# Patient Record
Sex: FEMALE | Race: WHITE | Employment: OTHER | URBAN - NONMETROPOLITAN AREA
[De-identification: names, ages, dates, MRNs, and addresses within clinical notes are randomized per-mention and may not be internally consistent; named-entity substitution may affect disease eponyms.]

---

## 2021-03-09 RX ORDER — AMLODIPINE BESYLATE 5 MG/1
TABLET ORAL
Qty: 90 TABLET | Refills: 1 | Status: SHIPPED
Start: 2021-03-09 | End: 2021-08-16

## 2021-05-05 ENCOUNTER — OUTSIDE SERVICES (OUTPATIENT)
Dept: FAMILY MEDICINE CLINIC | Age: 86
End: 2021-05-05
Payer: MEDICARE

## 2021-05-05 DIAGNOSIS — M19.90 OSTEOARTHRITIS, UNSPECIFIED OSTEOARTHRITIS TYPE, UNSPECIFIED SITE: ICD-10-CM

## 2021-05-05 DIAGNOSIS — M79.605 PAIN OF LEFT LOWER EXTREMITY: ICD-10-CM

## 2021-05-05 DIAGNOSIS — M54.50 LUMBAR PAIN: ICD-10-CM

## 2021-05-05 DIAGNOSIS — E78.2 MIXED HYPERLIPIDEMIA: ICD-10-CM

## 2021-05-05 DIAGNOSIS — I63.9 CEREBROVASCULAR ACCIDENT (CVA), UNSPECIFIED MECHANISM (HCC): ICD-10-CM

## 2021-05-05 DIAGNOSIS — K58.2 IRRITABLE BOWEL SYNDROME WITH BOTH CONSTIPATION AND DIARRHEA: Primary | ICD-10-CM

## 2021-05-05 DIAGNOSIS — I10 ESSENTIAL HYPERTENSION: ICD-10-CM

## 2021-05-05 DIAGNOSIS — M81.0 AGE-RELATED OSTEOPOROSIS WITHOUT CURRENT PATHOLOGICAL FRACTURE: ICD-10-CM

## 2021-05-05 NOTE — PROGRESS NOTES
leg no increase in warmth or deformity noted. Neurologically she  is alert and oriented x3. No evidence of paralysis or paresthesias noted. Diagnoses and all orders for this visit:    Irritable bowel syndrome with both constipation and diarrhea    Essential hypertension    Age-related osteoporosis without current pathological fracture    Lumbar pain    Mixed hyperlipidemia    Osteoarthritis, unspecified osteoarthritis type, unspecified site    Cerebrovascular accident (CVA), unspecified mechanism ([address]    Pain of left lower extremity      Will check an x-ray of the left lower extremity. Patient did take some Pepto-Bismol for her nausea and diarrhea. She will go ahead and take her blood pressure medicine to get that back down. She will let us know if that is not improved. Hanna Marcus, APRN - CNP        *Note was creating using voice recognition software.   The document was reviewed however grammatical errors may exist.

## 2021-06-09 ENCOUNTER — OUTSIDE SERVICES (OUTPATIENT)
Dept: FAMILY MEDICINE CLINIC | Age: 86
End: 2021-06-09

## 2021-06-09 DIAGNOSIS — E78.2 MIXED HYPERLIPIDEMIA: ICD-10-CM

## 2021-06-09 DIAGNOSIS — K58.2 IRRITABLE BOWEL SYNDROME WITH BOTH CONSTIPATION AND DIARRHEA: Primary | ICD-10-CM

## 2021-06-09 DIAGNOSIS — I10 ESSENTIAL HYPERTENSION: ICD-10-CM

## 2021-06-09 DIAGNOSIS — G47.00 INSOMNIA, UNSPECIFIED TYPE: ICD-10-CM

## 2021-06-09 DIAGNOSIS — K57.90 DIVERTICULAR DISEASE: ICD-10-CM

## 2021-06-09 DIAGNOSIS — M15.9 PRIMARY OSTEOARTHRITIS INVOLVING MULTIPLE JOINTS: ICD-10-CM

## 2021-06-09 DIAGNOSIS — G89.29 CHRONIC RIGHT-SIDED LOW BACK PAIN WITHOUT SCIATICA: ICD-10-CM

## 2021-06-09 DIAGNOSIS — M81.0 AGE-RELATED OSTEOPOROSIS WITHOUT CURRENT PATHOLOGICAL FRACTURE: ICD-10-CM

## 2021-06-09 DIAGNOSIS — K59.00 CONSTIPATION, UNSPECIFIED CONSTIPATION TYPE: ICD-10-CM

## 2021-06-09 DIAGNOSIS — R10.31 RIGHT LOWER QUADRANT ABDOMINAL PAIN: ICD-10-CM

## 2021-06-09 DIAGNOSIS — M54.50 CHRONIC RIGHT-SIDED LOW BACK PAIN WITHOUT SCIATICA: ICD-10-CM

## 2021-06-09 DIAGNOSIS — I63.9 CEREBROVASCULAR ACCIDENT (CVA), UNSPECIFIED MECHANISM (HCC): ICD-10-CM

## 2021-06-09 DIAGNOSIS — Q43.8 TORTUOUS COLON: ICD-10-CM

## 2021-06-09 DIAGNOSIS — R19.7 DIARRHEA, UNSPECIFIED TYPE: ICD-10-CM

## 2021-06-09 NOTE — PROGRESS NOTES
6/9/2021    Lynn Begin  1935    This resident is being seen today for acute evaluation visit. She is a resident who has long-term medical conditions including irritable bowel syndrome, hypertension, osteoporosis, lumbar pain, hyperlipidemia, degenerative arthritis, and cerebrovascular disease with history of CVA. Sirisha Younger She is a 82 y.o. female resident who is being seen today for abdominal pain with constipation and diarrhea. Patient states that she continues to have pain intermittently to the right lower abdomen. She does have episodes of diarrhea and episodes of constipation. She has been taking her MiraLAX as previously suggested by Dr. Martín Ventura. She did stop the Senokot because of the.'s of diarrhea. She states that she gets quite crampy when she has the episodes of diarrhea. He gets very fatigued. She states she is afraid to go out in public because she becomes very urgent and her need to go to the bathroom. She would like to see a gastrointestinal physician for consultation. .  Currently at this time she denies any complaints of chest pain or palpitations. Denies any headaches, any sore throat, coughing, or shortness of breath. No pain in lower extremities. No fever, or chills. No recent falls or syncopal events. Objective     Vital Signs: Blood pressure is 161/74 pulse 73 respirations 18 temp is 97.2 saturation 100% weight is 121.2 that is down about 7 pounds in the last 4 months. Physical examination:Skin is essentially warm and dry. HEENT unremarkable. Neck is supple. Heart regular rate and rhythm. No rubs, gallops or murmurs noted. Lungs are clear to auscultation. No evidence of rhonchi, rales, or wheezing. Abdomen is soft, supple and tender the right mid to lower abdomen. Bowel sounds are noted x4 quadrants. No rigidity, guarding or rebound tenderness. Negative Perkins's, negative McBurney's, negative Harinder's. Extremities; no true pitting edema. Pulses are adequate.  No clubbing

## 2021-06-23 ENCOUNTER — OFFICE VISIT (OUTPATIENT)
Dept: PRIMARY CARE CLINIC | Age: 86
End: 2021-06-23
Payer: MEDICARE

## 2021-06-23 VITALS
DIASTOLIC BLOOD PRESSURE: 60 MMHG | WEIGHT: 120.8 LBS | HEART RATE: 62 BPM | TEMPERATURE: 97.6 F | SYSTOLIC BLOOD PRESSURE: 120 MMHG | OXYGEN SATURATION: 98 %

## 2021-06-23 DIAGNOSIS — B37.0 ORAL THRUSH: Primary | ICD-10-CM

## 2021-06-23 PROCEDURE — 4040F PNEUMOC VAC/ADMIN/RCVD: CPT | Performed by: NURSE PRACTITIONER

## 2021-06-23 PROCEDURE — 1123F ACP DISCUSS/DSCN MKR DOCD: CPT | Performed by: NURSE PRACTITIONER

## 2021-06-23 PROCEDURE — 1036F TOBACCO NON-USER: CPT | Performed by: NURSE PRACTITIONER

## 2021-06-23 PROCEDURE — 1090F PRES/ABSN URINE INCON ASSESS: CPT | Performed by: NURSE PRACTITIONER

## 2021-06-23 PROCEDURE — G8400 PT W/DXA NO RESULTS DOC: HCPCS | Performed by: NURSE PRACTITIONER

## 2021-06-23 PROCEDURE — G8427 DOCREV CUR MEDS BY ELIG CLIN: HCPCS | Performed by: NURSE PRACTITIONER

## 2021-06-23 PROCEDURE — 99213 OFFICE O/P EST LOW 20 MIN: CPT | Performed by: NURSE PRACTITIONER

## 2021-06-23 PROCEDURE — G8421 BMI NOT CALCULATED: HCPCS | Performed by: NURSE PRACTITIONER

## 2021-06-23 RX ORDER — LISINOPRIL 20 MG/1
TABLET ORAL
COMMUNITY
Start: 2021-06-19

## 2021-06-23 RX ORDER — GABAPENTIN 300 MG/1
CAPSULE ORAL
COMMUNITY
Start: 2021-05-28

## 2021-06-23 RX ORDER — POLYETHYLENE GLYCOL 3350 17 G/17G
17 POWDER, FOR SOLUTION ORAL DAILY PRN
COMMUNITY

## 2021-06-23 ASSESSMENT — ENCOUNTER SYMPTOMS
WHEEZING: 0
TROUBLE SWALLOWING: 1
SORE THROAT: 1
PHOTOPHOBIA: 0
COLOR CHANGE: 0
COUGH: 0
EYE PAIN: 0
CHOKING: 0
VOMITING: 0
ABDOMINAL PAIN: 0
FACIAL SWELLING: 0
CONSTIPATION: 0
ABDOMINAL DISTENTION: 0
SINUS PRESSURE: 0
VOICE CHANGE: 0
NAUSEA: 0
BLOOD IN STOOL: 0
SHORTNESS OF BREATH: 0
CHEST TIGHTNESS: 0
DIARRHEA: 0
EYE DISCHARGE: 0
EYE ITCHING: 0
SINUS PAIN: 0
BACK PAIN: 0
EYE REDNESS: 0
RHINORRHEA: 0

## 2021-06-23 ASSESSMENT — VISUAL ACUITY: OU: 1

## 2021-06-23 NOTE — PROGRESS NOTES
neck pain and neck stiffness. Skin: Negative for color change, rash and wound. Allergic/Immunologic: Negative for environmental allergies and food allergies. Neurological: Negative for dizziness, tremors, seizures, syncope, facial asymmetry, speech difficulty, weakness, light-headedness, numbness and headaches. Hematological: Does not bruise/bleed easily. Psychiatric/Behavioral: Negative for agitation, behavioral problems, confusion, decreased concentration, hallucinations, self-injury, sleep disturbance and suicidal ideas. The patient is not nervous/anxious. Current Outpatient Medications:     lisinopril (PRINIVIL;ZESTRIL) 20 MG tablet, , Disp: , Rfl:     gabapentin (NEURONTIN) 300 MG capsule, , Disp: , Rfl:     polyethylene glycol (GLYCOLAX) 17 g packet, Take 17 g by mouth daily as needed, Disp: , Rfl:     nystatin (MYCOSTATIN) 390817 UNIT/ML suspension, Take 5 mLs by mouth 4 times daily for 10 days Retain in mouth as long as possible, Disp: 200 mL, Rfl: 0    amLODIPine (NORVASC) 5 MG tablet, TAKE 1 TABLET DAILY, Disp: 90 tablet, Rfl: 1  No Known Allergies    History reviewed. No pertinent past medical history. History reviewed. No pertinent surgical history. History reviewed. No pertinent family history.   Social History     Socioeconomic History    Marital status:      Spouse name: Not on file    Number of children: Not on file    Years of education: Not on file    Highest education level: Not on file   Occupational History    Not on file   Tobacco Use    Smoking status: Unknown If Ever Smoked    Smokeless tobacco: Never Used   Substance and Sexual Activity    Alcohol use: Not Currently    Drug use: Never    Sexual activity: Not on file   Other Topics Concern    Not on file   Social History Narrative    Not on file     Social Determinants of Health     Financial Resource Strain:     Difficulty of Paying Living Expenses:    Food Insecurity:     Worried About Running Out of Food in the Last Year:    [address] in the Last Year:    Transportation Needs:     Lack of Transportation (Medical):  Lack of Transportation (Non-Medical):    Physical Activity:     Days of Exercise per Week:     Minutes of Exercise per Session:    Stress:     Feeling of Stress :    Social Connections:     Frequency of Communication with Friends and Family:     Frequency of Social Gatherings with Friends and Family:     Attends Druze Services:     Active Member of Clubs or Organizations:     Attends Club or Organization Meetings:     Marital Status:    Intimate Partner Violence:     Fear of Current or Ex-Partner:     Emotionally Abused:     Physically Abused:     Sexually Abused:        Vitals:    06/23/21 1134   BP: 120/60   Pulse: 62   Temp: 97.6 °F (36.4 °C)   TempSrc: Temporal   SpO2: 98%   Weight: 120 lb 12.8 oz (54.8 kg)       Physical Exam  Vitals and nursing note reviewed. Constitutional:       General: She is awake. She is not in acute distress. Appearance: Normal appearance. She is well-developed. She is not ill-appearing, toxic-appearing or diaphoretic. HENT:      Head: Normocephalic and atraumatic. Right Ear: Hearing, tympanic membrane, ear canal and external ear normal. There is no impacted cerumen. Left Ear: Hearing, tympanic membrane, ear canal and external ear normal. There is no impacted cerumen. Nose: Nose normal. No congestion or rhinorrhea. Mouth/Throat:      Lips: Pink. No lesions. Mouth: Mucous membranes are moist.      Pharynx: Oropharynx is clear. No oropharyngeal exudate or posterior oropharyngeal erythema. Eyes:      General: Lids are normal. Vision grossly intact. Gaze aligned appropriately. No scleral icterus. Right eye: No discharge. Left eye: No discharge. Extraocular Movements: Extraocular movements intact.       Conjunctiva/sclera: Conjunctivae normal.      Right eye: Right conjunctiva is not Coordination normal.      Gait: Gait abnormal (Antalgic). Deep Tendon Reflexes: Reflexes normal.   Psychiatric:         Attention and Perception: Attention and perception normal.         Mood and Affect: Mood and affect normal.         Speech: Speech normal.         Behavior: Behavior normal. Behavior is cooperative. Thought Content: Thought content normal.         Cognition and Memory: Cognition and memory normal.         Judgment: Judgment normal.         Assessment and Plan:  Rhae Heimlich was seen today for oral swelling. Diagnoses and all orders for this visit:    Oral thrush  -     nystatin (MYCOSTATIN) [id number] UNIT/ML suspension; Take 5 mLs by mouth 4 times daily for 10 days Retain in mouth as long as possible        Discussions/Education provided to patients during visit:  [] Discussed the importance to stop smoking. [x] Advised to monitor eating habits. [] Reviewed and discussed Imaging results. [] Reviewed and discussed Lab results. [x] Discussed the importance of drinking plenty of fluids. [x] Cut down on Salt, Caffeine, and Sugar. [x] Continue Medications as Discussed. [x] Communicated with patient any concerns, to phone office. Return if symptoms worsen or fail to improve. I spent 15 minutes face-to-face with this patient.       Seen By:  NANDO Carpio NP

## 2021-07-02 ENCOUNTER — OFFICE VISIT (OUTPATIENT)
Dept: PRIMARY CARE CLINIC | Age: 86
End: 2021-07-02
Payer: MEDICARE

## 2021-07-02 VITALS
WEIGHT: 121.6 LBS | OXYGEN SATURATION: 97 % | HEART RATE: 56 BPM | DIASTOLIC BLOOD PRESSURE: 64 MMHG | TEMPERATURE: 97.1 F | SYSTOLIC BLOOD PRESSURE: 126 MMHG

## 2021-07-02 DIAGNOSIS — J02.9 ACUTE PHARYNGITIS, UNSPECIFIED ETIOLOGY: Primary | ICD-10-CM

## 2021-07-02 PROCEDURE — 1123F ACP DISCUSS/DSCN MKR DOCD: CPT | Performed by: NURSE PRACTITIONER

## 2021-07-02 PROCEDURE — 4040F PNEUMOC VAC/ADMIN/RCVD: CPT | Performed by: NURSE PRACTITIONER

## 2021-07-02 PROCEDURE — G8400 PT W/DXA NO RESULTS DOC: HCPCS | Performed by: NURSE PRACTITIONER

## 2021-07-02 PROCEDURE — 1090F PRES/ABSN URINE INCON ASSESS: CPT | Performed by: NURSE PRACTITIONER

## 2021-07-02 PROCEDURE — G8427 DOCREV CUR MEDS BY ELIG CLIN: HCPCS | Performed by: NURSE PRACTITIONER

## 2021-07-02 PROCEDURE — 99213 OFFICE O/P EST LOW 20 MIN: CPT | Performed by: NURSE PRACTITIONER

## 2021-07-02 PROCEDURE — 1036F TOBACCO NON-USER: CPT | Performed by: NURSE PRACTITIONER

## 2021-07-02 PROCEDURE — G8421 BMI NOT CALCULATED: HCPCS | Performed by: NURSE PRACTITIONER

## 2021-07-02 RX ORDER — AMOXICILLIN 875 MG/1
875 TABLET, COATED ORAL 2 TIMES DAILY
Qty: 14 TABLET | Refills: 0 | Status: SHIPPED | OUTPATIENT
Start: 2021-07-02 | End: 2021-07-09

## 2021-07-02 NOTE — PROGRESS NOTES
Chief Complaint: [place] (finished meds and feels like lump in throat and pressure up into right ear)      History of Present Illness   Source of history provided by:  patient. Ning Osorio is a 82 y.o. old female who presents to the [facility] care for sore throat. Pt states sore throat began 12 days ago. Also reports right sided ear pressure and a lump in her neck. Denies nasal congestion, low-grade fever, body aches, and occasional nausea. Denies any vomiting, abdominal pain, CP, SOB, cough, or lethargy. Exposed To: Streptococcus: no.                             Infectious Mononucleosis:  no.     Review of Systems   Unless otherwise stated in this report or unable to obtain because of the patient's clinical or mental status as evidenced by the medical record, this patients's positive and negative responses for Review of Systems, constitutional, psych, eyes, ENT, cardiovascular, respiratory, gastrointestinal, neurological, genitourinary, musculoskeletal, integument systems and systems related to the presenting problem are either stated in the preceding or were not pertinent or were negative for the symptoms and/or complaints related to the medical problem. Past Medical History:  has no past medical history on file. Past Surgical History:  has no past surgical history on file. Social History:  has an unknown smoking status. She has never used smokeless tobacco. She reports previous alcohol use. She reports that she does not use drugs. Family History: family history is not on file. Allergies: Patient has no known allergies. Physical Exam   Vital Signs:  /64   Pulse 56   Temp 97.1 °F (36.2 °C) (Temporal)   Wt 121 lb 9.6 oz (55.2 kg)   SpO2 97%    Oxygen Saturation Interpretation: Normal.    Constitutional:  Alert, development consistent with age. .  Ears:  TMs without perforation, injection, or bulging. External canals clear without exudate. Throat: Airway patent.

## 2021-07-08 ENCOUNTER — OFFICE VISIT (OUTPATIENT)
Dept: PRIMARY CARE CLINIC | Age: 86
End: 2021-07-08
Payer: MEDICARE

## 2021-07-08 VITALS
SYSTOLIC BLOOD PRESSURE: 130 MMHG | BODY MASS INDEX: 19.8 KG/M2 | TEMPERATURE: 97.2 F | WEIGHT: 123.2 LBS | DIASTOLIC BLOOD PRESSURE: 64 MMHG | HEART RATE: 65 BPM | HEIGHT: 66 IN | OXYGEN SATURATION: 99 %

## 2021-07-08 DIAGNOSIS — G62.9 NEUROPATHY: ICD-10-CM

## 2021-07-08 DIAGNOSIS — M25.461 KNEE EFFUSION, RIGHT: ICD-10-CM

## 2021-07-08 DIAGNOSIS — M54.50 CHRONIC RIGHT-SIDED LOW BACK PAIN WITHOUT SCIATICA: ICD-10-CM

## 2021-07-08 DIAGNOSIS — G89.29 CHRONIC RIGHT-SIDED LOW BACK PAIN WITHOUT SCIATICA: ICD-10-CM

## 2021-07-08 DIAGNOSIS — K58.2 IRRITABLE BOWEL SYNDROME WITH BOTH CONSTIPATION AND DIARRHEA: ICD-10-CM

## 2021-07-08 DIAGNOSIS — J02.9 ACUTE PHARYNGITIS, UNSPECIFIED ETIOLOGY: Primary | ICD-10-CM

## 2021-07-08 DIAGNOSIS — I10 ESSENTIAL HYPERTENSION: ICD-10-CM

## 2021-07-08 PROCEDURE — 99214 OFFICE O/P EST MOD 30 MIN: CPT | Performed by: FAMILY MEDICINE

## 2021-07-08 PROCEDURE — 4040F PNEUMOC VAC/ADMIN/RCVD: CPT | Performed by: FAMILY MEDICINE

## 2021-07-08 PROCEDURE — G8427 DOCREV CUR MEDS BY ELIG CLIN: HCPCS | Performed by: FAMILY MEDICINE

## 2021-07-08 PROCEDURE — 1123F ACP DISCUSS/DSCN MKR DOCD: CPT | Performed by: FAMILY MEDICINE

## 2021-07-08 PROCEDURE — G8420 CALC BMI NORM PARAMETERS: HCPCS | Performed by: FAMILY MEDICINE

## 2021-07-08 PROCEDURE — 1036F TOBACCO NON-USER: CPT | Performed by: FAMILY MEDICINE

## 2021-07-08 PROCEDURE — 1090F PRES/ABSN URINE INCON ASSESS: CPT | Performed by: FAMILY MEDICINE

## 2021-07-08 PROCEDURE — G8400 PT W/DXA NO RESULTS DOC: HCPCS | Performed by: FAMILY MEDICINE

## 2021-07-08 RX ORDER — AZITHROMYCIN 250 MG/1
250 TABLET, FILM COATED ORAL SEE ADMIN INSTRUCTIONS
Qty: 6 TABLET | Refills: 0 | Status: SHIPPED | OUTPATIENT
Start: 2021-07-08 | End: 2021-07-13

## 2021-07-08 ASSESSMENT — ENCOUNTER SYMPTOMS
VOMITING: 0
ABDOMINAL DISTENTION: 0
STRIDOR: 0
SINUS PRESSURE: 0
ANAL BLEEDING: 0
RHINORRHEA: 1
EYE PAIN: 0
COLOR CHANGE: 0
DIARRHEA: 0
SHORTNESS OF BREATH: 0
CHEST TIGHTNESS: 0
APNEA: 0
ABDOMINAL PAIN: 1
EYE REDNESS: 0
RECTAL PAIN: 0
COUGH: 0
FACIAL SWELLING: 0
NAUSEA: 0
PHOTOPHOBIA: 0
SINUS PAIN: 0
CONSTIPATION: 0
TROUBLE SWALLOWING: 1
WHEEZING: 0
SORE THROAT: 1
BLOOD IN STOOL: 0
EYE ITCHING: 0
CHOKING: 0
VOICE CHANGE: 0
EYE DISCHARGE: 0

## 2021-07-08 ASSESSMENT — PATIENT HEALTH QUESTIONNAIRE - PHQ9
SUM OF ALL RESPONSES TO PHQ QUESTIONS 1-9: 0
SUM OF ALL RESPONSES TO PHQ9 QUESTIONS 1 & 2: 0
2. FEELING DOWN, DEPRESSED OR HOPELESS: 0
1. LITTLE INTEREST OR PLEASURE IN DOING THINGS: 0
SUM OF ALL RESPONSES TO PHQ QUESTIONS 1-9: 0
SUM OF ALL RESPONSES TO PHQ QUESTIONS 1-9: 0

## 2021-07-08 NOTE — PROGRESS NOTES
21  Aicha Search : 1935 Sex: female  Age: 82 y.o. Chief Complaint   Patient presents with    Pharyngitis     3rd time here recently, still feels like lump in throat and facial pressure on right side of face into right ear , took last antibiotic this am       HPI;Patient is seen today on an urgent basis per patient request inasmuch as patient continues to relate to a rather persistent type of pharyngitis primarily involving the right lateral pharynx. Patient had been given the benefit of inventions, including long-acting antihistamine therapies, in addition to a course of amoxicillin therapies, and Mycostatin swish and swallow therapies, without significant resolution. Patient does acknowledge some degree of right-sided neck pain, although there has had no fever or chills, no chest pain or obvious respiratory distress, no clinical desaturations, cough, or pleuritic pain. There has been no obvious signs or symptoms of sinus drainage or frontal cephalgia. Patient's history is significant inasmuch as patient most recently had been convalescing from what appeared to be a right knee effusion with underlying status post right total knee arthroplasty in years past; recent x-ray studies of the right knee were essentially unremarkable; patient was given the benefit of appropriate immobilization and ibuprofen products with significant resolution as such. Patient's history furthermore includes one of chronic right-sided low back pain in addition to hypertension, and chronic neuropathy. Patient remains alert and oriented as to time place person, and  remains independently ambulatory. Patient most recently had been seen and evaluated per Department of orthopedic surgery, Dr. Luke Lee, whereby evaluation of the knee was consistent with most likely traumatic joint effusion, with recommendations again including conservative nonaggressive interventions, inasmuch as effusion has just about completely resolved. Patient does have follow-up CT of the abdomen and pelvis pending, and does have follow-up evaluation per Department of gastroenterology as such inasmuch as patient continues to remain rather symptomatic with respect to nonspecific right lower quadrant discomfort, with episodic diarrhea; there has been no evidence of emesis,or hematemesis; there has been no melanotic stools or obvious rectal bleed, with patient's body weight  relatively stable. Patient furthermore relates that she continues to have some degree of chronic right-sided lumbar pain without sciatica. There has been no recent trauma, radicular pain, paralysis, or paresthesia, though patient does present with a chronic scoliosis. Review of Systems   Constitutional: Negative for appetite change, chills, diaphoresis, fatigue, fever and unexpected weight change. HENT: Positive for ear pain, rhinorrhea, sore throat and trouble swallowing. Negative for congestion, ear discharge, facial swelling, hearing loss, mouth sores, nosebleeds, sinus pressure, sinus pain, sneezing, tinnitus and voice change. Examination of the HEENT unremarkable; there does appear to be some tenderness about the right anterior neck region although no distinct adenopathy or nuchal rigidity. Eyes: Negative for photophobia, pain, discharge, redness, itching and visual disturbance. Respiratory: Negative for apnea, cough, choking, chest tightness, shortness of breath, wheezing and stridor. Cardiovascular: Negative for chest pain, palpitations and leg swelling. Gastrointestinal: Positive for abdominal pain. Negative for abdominal distention, anal bleeding, blood in stool, constipation, diarrhea, nausea, rectal pain and vomiting. Endocrine: Negative for cold intolerance, heat intolerance, polydipsia, polyphagia and polyuria.    Genitourinary: Negative for decreased urine volume, difficulty urinating, dysuria, enuresis, flank pain, frequency, genital sores, hematuria and urgency. Musculoskeletal: Positive for arthralgias, back pain and joint swelling. Negative for gait problem, myalgias, neck pain and neck stiffness. Skin: Negative for color change, pallor and rash. Allergic/Immunologic: Negative for environmental allergies, food allergies and immunocompromised state. Neurological: Negative for dizziness, tremors, seizures, syncope, facial asymmetry, speech difficulty, weakness, light-headedness, numbness and headaches. Hematological: Negative for adenopathy. Does not bruise/bleed easily. Psychiatric/Behavioral: Negative for agitation, behavioral problems, confusion, decreased concentration, dysphoric mood, hallucinations, self-injury, sleep disturbance and suicidal ideas. The patient is not nervous/anxious and is not hyperactive. Current Outpatient Medications:     lisinopril (PRINIVIL;ZESTRIL) 20 MG tablet, , Disp: , Rfl:     gabapentin (NEURONTIN) 300 MG capsule, , Disp: , Rfl:     polyethylene glycol (GLYCOLAX) 17 g packet, Take 17 g by mouth daily as needed, Disp: , Rfl:     amLODIPine (NORVASC) 5 MG tablet, TAKE 1 TABLET DAILY, Disp: 90 tablet, Rfl: 1    lactulose (CHRONULAC) 10 GM/15ML solution, TAKE 15 ML BY MOUTH EVERY DAY. TITRATE AS NEEDED, Disp: , Rfl:   No Known Allergies    History reviewed. No pertinent past medical history. History reviewed. No pertinent surgical history. History reviewed. No pertinent family history.   Social History     Socioeconomic History    Marital status:      Spouse name: Not on file    Number of children: Not on file    Years of education: Not on file    Highest education level: Not on file   Occupational History    Not on file   Tobacco Use    Smoking status: Unknown If Ever Smoked    Smokeless tobacco: Never Used   Substance and Sexual Activity    Alcohol use: Not Currently    Drug use: Never    Sexual activity: Not on file   Other Topics Concern    Not on file   Social History Narrative    Not on file     Social Determinants of Health     Financial Resource Strain:     Difficulty of Paying Living Expenses:    Food Insecurity:     Worried About Running Out of Food in the Last Year:     [address] in the Last Year:    Transportation Needs:     Lack of Transportation (Medical):  Lack of Transportation (Non-Medical):    Physical Activity:     Days of Exercise per Week:     Minutes of Exercise per Session:    Stress:     Feeling of Stress :    Social Connections:     Frequency of Communication with Friends and Family:     Frequency of Social Gatherings with Friends and Family:     Attends Taoism Services:     Active Member of Clubs or Organizations:     Attends Club or Organization Meetings:     Marital Status:    Intimate Partner Violence:     Fear of Current or Ex-Partner:     Emotionally Abused:     Physically Abused:     Sexually Abused:        Vitals:    07/08/21 1305   BP: 130/64   Pulse: 65   Temp: 97.2 °F (36.2 °C)   TempSrc: Temporal   SpO2: 99%   Weight: 123 lb 3.2 oz (55.9 kg)   Height: 5' 6\" (1.676 m)       Physical Exam  Vitals and nursing note reviewed. Constitutional:       General: She is not in acute distress. Appearance: Normal appearance. She is not ill-appearing, toxic-appearing or diaphoretic. HENT:      Head: Normocephalic. Comments: Emanation of the HEENT does reveal some tenderness about the right anterior cervical chain, although no distinct adenopathy. There does appear to be some clear drainage about the posterior pharynx although no evidence of exudate. There is no nuchal rigidity with remaining HEENT unremarkable. Right Ear: Tympanic membrane, ear canal and external ear normal.      Left Ear: Tympanic membrane, ear canal and external ear normal.      Nose: Nose normal. No congestion or rhinorrhea. Mouth/Throat:      Pharynx: Oropharynx is clear. No oropharyngeal exudate or posterior oropharyngeal erythema.    Eyes:      General: No scleral icterus. Conjunctiva/sclera: Conjunctivae normal.      Pupils: Pupils are equal, round, and reactive to light. Neck:      Vascular: No carotid bruit. Cardiovascular:      Rate and Rhythm: Normal rate and regular rhythm. Heart sounds: Normal heart sounds. No murmur heard. No gallop. Pulmonary:      Effort: No respiratory distress. Breath sounds: No wheezing, rhonchi or rales. Abdominal:      General: Abdomen is flat. Bowel sounds are normal. There is no distension. Palpations: Abdomen is soft. There is no mass. Tenderness: There is abdominal tenderness. There is no right CVA tenderness, left CVA tenderness, guarding or rebound. Hernia: No hernia is present. Comments: Examination of the abdomen is soft supple; there does appear to be some residual tenderness as noted about the right lower quadrant, although there is no rigidity, guarding, or rebound. There is no palpable hepatosplenomegaly; remaining abdominal examination unremarkable. Musculoskeletal:         General: Tenderness present. No swelling or deformity. Normal range of motion. Cervical back: Normal range of motion and neck supple. No rigidity. No muscular tenderness. Right lower leg: No edema. Left lower leg: No edema. Comments: Closer examination of the vertebral spine does reveal a chronic scoliosis. Examination of the lower lumbar area does reveal tenderness about the right paralumbar area involving the right psoas muscle with some reproducible tenderness to palpation. There is no warmth to touch or palpable effusion. Heel to heel toe to toe ambulation accomplished without difficulty; straight leg raising test passively 90° bilaterally; Sergei's test essentially unremarkable.  Closer examination of the right knee reveals resolution of the previously noted effusion; there is evidence of surgical scar, consistent  with the patient's remote history of right total knee arthroplasty. Peripheral pulses are adequate; there is no edema, clubbing, or cyanosis, with remaining exam unremarkable. Lymphadenopathy:      Cervical: No cervical adenopathy. Skin:     General: Skin is warm and dry. Coloration: Skin is not jaundiced or pale. Findings: No bruising, erythema or rash. Neurological:      General: No focal deficit present. Mental Status: She is alert and oriented to person, place, and time. Mental status is at baseline. Cranial Nerves: No cranial nerve deficit. Sensory: No sensory deficit. Motor: No weakness. Gait: Gait normal.   Psychiatric:         Mood and Affect: Mood normal.         Behavior: Behavior normal.         Assessment and Plan:  Yessi Boykin was seen today for pharyngitis. Diagnoses and all orders for this visit:    Acute pharyngitis, unspecified etiology  Comments:  Currently symptomatic/unstable. We will proceed with trial of a Z-Rosas to be taken as directed in addition to symptomatic treatment. Orders:  -     azithromycin (ZITHROMAX) 250 MG tablet; Take 1 tablet by mouth See Admin Instructions for 5 days 500mg on day 1 followed by 250mg on days 2 - 5    Knee effusion, right  Comments:  Currently stable/asymptomatic. Patient's most recent imaging studies of the right knee were furthermore noted and reviewed as was patient's orthopedic eval.    Irritable bowel syndrome with both constipation and diarrhea  Comments:  Currently unstable/symptomatic. Patient does have follow-up CT of the abdomen and pelvis in addition to GI evaluation pending. Chronic right-sided low back pain without sciatica  Comments:  Currently unstable/symptomatic. At this point in time, consideration may be made for follow up with x-rays of the lumbosacral spine ,and PT/ OT evaluation. Essential hypertension  Comments:  Stable/asymptomatic on amlodipine therapies. Neuropathy  Comments:  Stable/asymptomatic on chronic Neurontin therapies.         Return in 4 weeks (on 8/5/2021), or if symptoms worsen or fail to improve. Plan; patient's medications and labs are reviewed. Once again given the persistent symptomatology with respect to pharyngitis as such patient will be given a trial of a Z-Rosas as directed. Patient advised to utilize Flonase nasal spray and to continue with long-acting over-the-counter antihistamine therapies in addition to warm salt gargles. Patient furthermore advised to utilize ibuprofen products on a as needed basis as such. Fluids and hydration were furthermore encouraged. Given the patient's chronic lumbar pain, consideration may be made to obtain appropriate imaging studies of the lumbosacral spine, with further consideration made for a course of PT/ OT. Considering the patient's chronic abdominal pain, perhaps as a manifestation of irritable bowel, will await further input with respect to CT abdomen and pelvis in addition to GI evaluation. Patient will be instructed to continue with the current care plan as such and to follow-up in the office setting within the next 4 to 6-week intervals. Further recommendations forthcoming.     Seen By:  Pura Santos DO

## 2021-07-16 ENCOUNTER — OFFICE VISIT (OUTPATIENT)
Dept: PRIMARY CARE CLINIC | Age: 86
End: 2021-07-16
Payer: MEDICARE

## 2021-07-16 VITALS
SYSTOLIC BLOOD PRESSURE: 150 MMHG | DIASTOLIC BLOOD PRESSURE: 70 MMHG | WEIGHT: 122 LBS | HEIGHT: 66 IN | BODY MASS INDEX: 19.61 KG/M2 | HEART RATE: 56 BPM | OXYGEN SATURATION: 99 % | TEMPERATURE: 97.1 F

## 2021-07-16 DIAGNOSIS — H66.91 RIGHT OTITIS MEDIA, UNSPECIFIED OTITIS MEDIA TYPE: Primary | ICD-10-CM

## 2021-07-16 PROCEDURE — 1036F TOBACCO NON-USER: CPT | Performed by: INTERNAL MEDICINE

## 2021-07-16 PROCEDURE — 1123F ACP DISCUSS/DSCN MKR DOCD: CPT | Performed by: INTERNAL MEDICINE

## 2021-07-16 PROCEDURE — G8427 DOCREV CUR MEDS BY ELIG CLIN: HCPCS | Performed by: INTERNAL MEDICINE

## 2021-07-16 PROCEDURE — 4040F PNEUMOC VAC/ADMIN/RCVD: CPT | Performed by: INTERNAL MEDICINE

## 2021-07-16 PROCEDURE — G8420 CALC BMI NORM PARAMETERS: HCPCS | Performed by: INTERNAL MEDICINE

## 2021-07-16 PROCEDURE — G8400 PT W/DXA NO RESULTS DOC: HCPCS | Performed by: INTERNAL MEDICINE

## 2021-07-16 PROCEDURE — 1090F PRES/ABSN URINE INCON ASSESS: CPT | Performed by: INTERNAL MEDICINE

## 2021-07-16 PROCEDURE — 99213 OFFICE O/P EST LOW 20 MIN: CPT | Performed by: INTERNAL MEDICINE

## 2021-07-16 RX ORDER — METHYLPREDNISOLONE 4 MG/1
TABLET ORAL
Qty: 1 KIT | Refills: 0 | Status: SHIPPED | OUTPATIENT
Start: 2021-07-16 | End: 2021-07-22

## 2021-07-16 RX ORDER — AMOXICILLIN AND CLAVULANATE POTASSIUM 875; 125 MG/1; MG/1
1 TABLET, FILM COATED ORAL 2 TIMES DAILY
Qty: 20 TABLET | Refills: 0 | Status: SHIPPED | OUTPATIENT
Start: 2021-07-16 | End: 2021-07-26

## 2021-07-16 RX ORDER — LACTULOSE 10 G/15ML
SOLUTION ORAL
COMMUNITY
Start: 2021-07-09

## 2021-07-16 NOTE — PROGRESS NOTES
Chief Complaint:   Pharyngitis (States that she has been here to see her PCP for this issue a few times. States that it started as thrush, and then she had a lump in her throat and is sore. She has had 2 antibiotics for this, and when she finished her zpack it was better but now it has come back again. States that it is all on the right side and she has alot of ear preasure and drainage.)      History of Present Illness   Source of history provided by:  patient. Charleen Mccormick is a 82 y.o. old female with a past medical history of: No past medical history on file. Presents to the walk in clinic for evaluation of nasal congestion, nasal drainage, right sided ear pressure, mild non productive cough and sore throat for over 2 weeks. Patient has had 2 separate courses of antibiotics over the last couple weeks. She was initially placed on amoxicillin and after that given a Z-Rosas. Patient states that she is feeling well while on the Z-Rosas although within a day or 2 of finishing her course her symptoms returned and are now accompanied by right ear pain and pressure. Denies any fever, chills, wheezing, CP, SOB, or GI symptoms. Denies any hx of asthma, COPD, or tobacco use. Denies any contact with any individuals with known COVID-19 infection or under investigation for COVID-19 infection. ROS    Unless otherwise stated in this report or unable to obtain because of the patient's clinical or mental status as evidenced by the medical record, this patients's positive and negative responses for Review of Systems, constitutional, psych, eyes, ENT, cardiovascular, respiratory, gastrointestinal, neurological, genitourinary, musculoskeletal, integument systems and systems related to the presenting problem are either stated in the preceding or were not pertinent or were negative for the symptoms and/or complaints related to the medical problem.     Past Surgical History:  has no past surgical history on file.  Social History:  has an unknown smoking status. She has never used smokeless tobacco. She reports previous alcohol use. She reports that she does not use drugs. Family History: family history is not on file. Allergies: Patient has no known allergies. Physical Exam         VS:  BP (!) 150/70 Comment: patient forgot to take her BP meds this morning. Pulse 56   Temp 97.1 °F (36.2 °C)   Ht 5' 6\" (1.676 m)   Wt 122 lb (55.3 kg)   SpO2 99%   BMI 19.69 kg/m²    Oxygen Saturation Interpretation: Normal.    Constitutional:  Alert, development consistent with age. Ears:  External Ears: Bilateral pinna normal.  Right TM with surrounding erythema and mild bulging. Left TM without erythema. No signs of perforation bilaterally. Canals normal bilaterally without swelling or exudate  Nose:  Mild congestion of the nasal mucosa. There is no injection to middle turbinates bilaterally. Throat: moderate posterior pharyngeal erythema with mild post nasal drip present. No exudate or tonsillar hypertrophy noted. Neck:  Supple. There is right sided anterior cervical adenopathy. Lungs: CTAB without wheezes, rales, or rhonchi  Heart:  Regular rate and rhythm, normal heart sounds, without pathological murmurs, ectopy, gallops, or rubs. Skin:  Normal turgor. Warm, dry, without visible rash. Neurological:  Alert and oriented. Motor functions intact. Responds to verbal commands. Lab / Imaging Results   (All laboratory and radiology results have been personally reviewed by myself)  Labs:  No results found for this visit on 07/16/21. Assessment / Plan     Impression(s):  Luis Ruth was seen today for pharyngitis. Diagnoses and all orders for this visit:    Right otitis media, unspecified otitis media type  -     amoxicillin-clavulanate (AUGMENTIN) 875-125 MG per tablet; Take 1 tablet by mouth 2 times daily for 10 days  -     methylPREDNISolone (MEDROL DOSEPACK) 4 MG tablet;  Take by mouth.      Disposition:  Disposition: Discharge to home. Advised to Increase fluids and rest. Script written as above, side effects discussed. Additional symptomatic relief discussed. PCP in 5-7 days if symptoms persist. ED sooner if symptoms worsen or change. Red flag symptoms discussed. Pt is in agreement with this care plan. All questions answered. Sal Shoulders, DO    This visit was provided as a focused evaluation during the COVID -19 pandemic/national emergency. A comprehensive review of all previous patient history and testing was not conducted. Pertinent findings were elicited during the visit.

## 2021-08-01 PROBLEM — M54.50 CHRONIC RIGHT-SIDED LOW BACK PAIN WITHOUT SCIATICA: Status: ACTIVE | Noted: 2021-08-01

## 2021-08-01 PROBLEM — I10 ESSENTIAL HYPERTENSION: Status: ACTIVE | Noted: 2021-08-01

## 2021-08-01 PROBLEM — K58.2 IRRITABLE BOWEL SYNDROME WITH BOTH CONSTIPATION AND DIARRHEA: Status: ACTIVE | Noted: 2021-08-01

## 2021-08-01 PROBLEM — G62.9 NEUROPATHY: Status: ACTIVE | Noted: 2021-08-01

## 2021-08-01 PROBLEM — G89.29 CHRONIC RIGHT-SIDED LOW BACK PAIN WITHOUT SCIATICA: Status: ACTIVE | Noted: 2021-08-01

## 2021-08-01 PROBLEM — M25.461 KNEE EFFUSION, RIGHT: Status: ACTIVE | Noted: 2021-08-01

## 2021-08-01 ASSESSMENT — ENCOUNTER SYMPTOMS: BACK PAIN: 1

## 2021-08-16 RX ORDER — AMLODIPINE BESYLATE 5 MG/1
TABLET ORAL
Qty: 90 TABLET | Refills: 1 | Status: SHIPPED
Start: 2021-08-16 | End: 2022-03-02

## 2021-09-22 ENCOUNTER — OUTSIDE SERVICES (OUTPATIENT)
Dept: FAMILY MEDICINE CLINIC | Age: 86
End: 2021-09-22
Payer: MEDICARE

## 2021-09-22 DIAGNOSIS — M15.9 PRIMARY OSTEOARTHRITIS INVOLVING MULTIPLE JOINTS: ICD-10-CM

## 2021-09-22 DIAGNOSIS — K58.2 IRRITABLE BOWEL SYNDROME WITH BOTH CONSTIPATION AND DIARRHEA: ICD-10-CM

## 2021-09-22 DIAGNOSIS — R10.31 RIGHT LOWER QUADRANT ABDOMINAL PAIN: ICD-10-CM

## 2021-09-22 DIAGNOSIS — I10 ESSENTIAL HYPERTENSION: Primary | ICD-10-CM

## 2021-09-22 DIAGNOSIS — E78.2 MIXED HYPERLIPIDEMIA: ICD-10-CM

## 2021-09-22 NOTE — PROGRESS NOTES
500 mg with vitamin D daily  Prolia 60 mg every 6 months   Gabapentin 300 mg twice daily  Vitamin D 1000 units daily  MiraLAX 17 g daily  Senokot daily  Lactulose daily  Nystatin powder twice daily as needed  Melatonin 6 mg at bedtime as needed        Objective     Vital Signs: /64 heart rate 61 respirations 20 temperature 96.9 SPO2 97% weight 124.8 pounds        Physical examination:Skin is essentially warm and dry. Surgical scar noted to anterior aspect of right knee. HEENT unremarkable. Neck is supple. Heart regular rate and rhythm. No rubs, gallops or murmurs noted. Lungs are clear to auscultation. No evidence of rhonchi, rales, or wheezing. Abdomen is soft, supple and non-tender. Bowel sounds are noted x4 quadrants. No rigidity, guarding or rebound tenderness. Negative Perkins's, negative McBurney's, negative Harinder's. Extremities; no true pitting edema. Pulses are adequate. No clubbing  or no cyanosis noted. Neurologically she  is alert and oriented x3. No evidence of paralysis or paresthesias noted. Diagnoses and all orders for this visit:    Essential hypertension  Comments:  Maintain lisinopril with amlodipine and low-dose aspirin    Primary osteoarthritis involving multiple joints  Comments:  Continue forth with Prolia every 6 months    Irritable bowel syndrome with both constipation and diarrhea  Comments:  Seen in conjunction with Department of gastroenterology with Dr. Colen Brittle does maintain the benefit of MiraLAX and lactulose    Right lower quadrant abdominal pain  Comments:  Etiology to be determined    Mixed hyperlipidemia  Comments:  Currently controlled with low-dose aspirin and close observation of lipid panel          Plan:  Plan of care was discussed with the healthcare team with medications and labs reviewed. Resident did have labs completed in July with a BUN/creatinine 16/1.07 with a GFR 48 H/H 11.2/33.3 with iron studies within normal limits.   Resident does appear to be doing relatively well and will follow up with Dr. Nara Giordano nurse practitioner on Friday, regarding her diarrhea alternating with constipation which is most likely consistent with IBS. She will follow up with orthopedic surgery in the course of 2 years, as recommended, unless an acute concerns would arise. She will continue with exercises given to her for her complaints of chronic lumbar pain, per the recommendations of orthopedic surgery. Resident does state that she had physical therapy and recent months past which she would describe as weights less. I would recommend that she follow-up with us in the course of 3 months and prior to her visit have the benefit of labs including CBC, CMP, and a vitamin D. She will otherwise continue forth with her current medical regimen as stated and I will see her routinely and as needed with further orders forthcoming. GIOVANNA NEWSOME, APRN - CNP      *Note was creating using voice recognition software.   The document was reviewed however grammatical errors may exist.

## 2021-12-29 ENCOUNTER — OUTSIDE SERVICES (OUTPATIENT)
Dept: FAMILY MEDICINE CLINIC | Age: 86
End: 2021-12-29
Payer: MEDICARE

## 2021-12-29 DIAGNOSIS — M81.0 AGE-RELATED OSTEOPOROSIS WITHOUT CURRENT PATHOLOGICAL FRACTURE: ICD-10-CM

## 2021-12-29 DIAGNOSIS — R22.42 LUMP OF SKIN OF LOWER EXTREMITY, LEFT: Primary | ICD-10-CM

## 2021-12-29 DIAGNOSIS — G62.9 NEUROPATHY: ICD-10-CM

## 2021-12-29 DIAGNOSIS — R60.9 PERIPHERAL EDEMA: ICD-10-CM

## 2021-12-29 DIAGNOSIS — I10 ESSENTIAL HYPERTENSION: ICD-10-CM

## 2021-12-29 NOTE — PROGRESS NOTES
12/29/2021    Matilde Bradford  1935    This resident is being seen today for a follow-up visit. 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  She is a 82 y.o. female resident who is being seen today for a follow-up evaluation with resident indicating that she was recently seen in conjunction with the gastroenterologist nurse practitioner and was placed on MiraLAX daily at noon with lactulose every morning and feels that her bowels have been more controlled and the pain in her right side has improved. She furthermore admits that she continues to have some degree of back pain, which is related to underlying arthritis with a slight curve but indicates that the pain is intermittent. She also states that she has not been doing her exercises as was recommended by physical therapy. She did bring to my attention that she has a lump on the left lower extremity that she just noted over the course of the past month. There is no pain associated with it. She furthermore denies any headaches or dizziness, sore throat, chest pain, coughing or shortness of breath, nausea or vomiting, constipation or diarrhea, fever or chills, falls or syncopal events.           Medications:  Lisinopril 20 mg 1.5 tablets daily  Amlodipine besylate 5 mg daily   aspirin 81 mg daily  Calcium 500 mg with vitamin D daily  Prolia 60 mg every 6 months   Gabapentin 300 mg twice daily  Vitamin D 1000 units daily  MiraLAX 17 g daily  Senokot daily  Lactulose daily  Nystatin powder twice daily as needed  Melatonin 6 mg at bedtime as needed  MiraLAX 17 g daily  Lactulose 1 tablespoon every morning  Hydrochlorothiazide 25 mg daily          Objective Vital Signs: /74 with a recheck of 174/69 heart rate 74 respirations 18 temperature 96.3 SPO2 98% weight 124.8 pounds        Physical examination:Skin is essentially warm and dry. Surgical scar noted to anterior aspect of right knee. She does have a palpable lump noted to the inner aspect of her left lower extremity which is nonmovable. HEENT unremarkable. Neck is supple. Heart regular rate and rhythm. No rubs, gallops or murmurs noted. Lungs are clear to auscultation. No evidence of rhonchi, rales, or wheezing. Abdomen is soft, supple and non-tender. Bowel sounds are noted x4 quadrants. No rigidity, guarding or rebound tenderness. Negative Perkins's, negative McBurney's, negative Harinder's. Extremities; she does have some degree of residual edema to the bilateral lower extremities. Pulses are adequate. No clubbing  or no cyanosis noted. Neurologically she  is alert and oriented x3. No evidence of paralysis or paresthesias noted. Diagnoses and all orders for this visit:    Lump of skin of lower extremity, left  Comments:  Obtain ultrasound    Essential hypertension  Comments:  Maintain lisinopril with amlodipine and initiate hydrochlorothiazide    Peripheral edema  Comments:  Initiate hydrochlorothiazide 25 mg daily    Age-related osteoporosis without current pathological fracture  Comments:  Maintain vitamins    Neuropathy  Comments:  Stable with gabapentin          Plan:  Plan of care was discussed with the healthcare team with medications and labs reviewed. Most recent labs dated 12/13/2021 with a BUN/creatinine 22/0.95 with a GFR 55 H/H 10.6/31.1 vitamin D 34. Due to the lump to the left lower extremity, I am been to recommend an ultrasound to the area. Furthermore, resident will be started on hydrochlorothiazide 25 mg daily due to some degree of edema with an elevated blood pressure and I will plan to follow-up with her regarding this in the course of 2 weeks.   I am hesitant to upward titrate the amlodipine due to the peripheral edema. She will otherwise follow-up with 3 months time spans and prior to her next visit will have blood work completed with respect of CBC, CMP, vitamin D and iron studies. I will otherwise continue with her current medical regimen as directed and see her routinely and as needed with further orders forthcoming. Addendum: I did receive back the ultrasound report for this resident with a small benign-appearing 5 mm or less soft tissue mass noted. I did ask the staff to update her on the results. GIOVANNA NEWSOME, APRN - CNP      *Note was creating using voice recognition software.   The document was reviewed however grammatical errors may exist.

## 2022-01-12 ENCOUNTER — OUTSIDE SERVICES (OUTPATIENT)
Dept: FAMILY MEDICINE CLINIC | Age: 87
End: 2022-01-12
Payer: MEDICARE

## 2022-01-12 DIAGNOSIS — E78.2 MIXED HYPERLIPIDEMIA: ICD-10-CM

## 2022-01-12 DIAGNOSIS — R22.42 LUMP OF SKIN OF LOWER EXTREMITY, LEFT: ICD-10-CM

## 2022-01-12 DIAGNOSIS — M15.9 PRIMARY OSTEOARTHRITIS INVOLVING MULTIPLE JOINTS: ICD-10-CM

## 2022-01-12 DIAGNOSIS — K59.00 CONSTIPATION, UNSPECIFIED CONSTIPATION TYPE: ICD-10-CM

## 2022-01-12 DIAGNOSIS — I10 ESSENTIAL HYPERTENSION: Primary | ICD-10-CM

## 2022-01-12 NOTE — PROGRESS NOTES
1/12/2022    Siddhartha Luna  1935    This resident is being seen today for a follow-up visit. 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  She is a 82 y.o. female resident who is being seen today for a 2-week follow-up evaluation visit with respect to blood pressure management. Resident has been doing relatively well and denies any complaints in terms of headaches or dizziness, sore throat, chest pain, coughing or shortness of breath, nausea or vomiting, constipation or diarrhea, dysuria or frequency, fever or chills, falls or syncopal events. She has been concerned about a lump to the medial calf region, with which I did obtain an ultrasound with confirmation of a small benign-appearing 5 mm or less soft tissue mass. I did explain the results to the resident and just told her that we will continue with close observation but if she did have any changes to the area to let me know. Medications:  Lisinopril 20 mg 1.5 tablets daily  Amlodipine besylate 5 mg daily   aspirin 81 mg daily  Calcium 600 mg with vitamin D daily  Prolia 60 mg every 6 months   Gabapentin 300 mg twice daily  Vitamin D 1000 units daily  MiraLAX 17 g daily  Lactulose 10 g per 15 mL with 1 tablespoon daily  Melatonin 6 mg at bedtime as needed  MiraLAX 17 g daily  Hydrochlorothiazide 25 mg daily  Zioptan 0.0015% 1 drop both eyes daily            Objective     Vital Signs: /73  heart rate 69 respirations 18 temperature 96.6 SPO2 97% weight 124.0 pounds        Physical examination:Skin is essentially warm and dry. Surgical scar noted to anterior aspect of right knee.   She does have a palpable lump noted to the inner aspect of her left lower extremity which is nonmovable. HEENT unremarkable. Neck is supple. Heart regular rate and rhythm. No rubs, gallops or murmurs noted. Lungs are clear to auscultation. No evidence of rhonchi, rales, or wheezing. Abdomen is soft, supple and non-tender. Bowel sounds are noted x4 quadrants. No rigidity, guarding or rebound tenderness. Negative Perkins's, negative McBurney's, negative Harinder's. Extremities; she does have some degree of residual edema to the bilateral lower extremities. Pulses are adequate. No clubbing  or no cyanosis noted. Neurologically she  is alert and oriented x3. No evidence of paralysis or paresthesias noted. Diagnoses and all orders for this visit:    Essential hypertension  Comments:  Currently controlled with lisinopril, amlodipine and additional benefits of hydrochlorothiazide    Lump of skin of lower extremity, left  Comments:  Compatible with small benign-appearing 5 mm or less soft tissue mass, as noted per ultrasound study    Constipation, unspecified constipation type  Comments:  Controlled with MiraLAX and lactulose as needed for gastroenterology    Mixed hyperlipidemia  Comments:  Controlled with low-dose aspirin and close observation of lipid panel    Primary osteoarthritis involving multiple joints  Comments:  Currently stable with vitamin supplementation with Prolia with a prescription to be given to the resident upon the next visit for a DEXA scan          Plan:  Plan of care was discussed with the healthcare team with medications and labs reviewed. Ultrasound results as furthermore noted and reviewed. Again, we will continue with close observation of the area and resident will update us if she has any changes. Given the fact that she does have improvement with respect to blood pressure management, I am been to maintain the hydrochlorothiazide of 25 mg daily. Staff will ensure that she has refills available to her at [facility].   In addition, I will discuss with her at our next visit her DEXA scan. She did have one completed in February 2020 and cannot have another one until February 2022. I will therefore give her prescription at the next visit. Resident will continue with her current medical regimen as directed and I will track her intakes, monitor her weights and behaviors, and see her routinely and as needed with further orders forthcoming. GIOVANNA NEWSOME, APRN - CNP      *Note was creating using voice recognition software.   The document was reviewed however grammatical errors may exist.

## 2022-02-21 RX ORDER — AMLODIPINE BESYLATE 5 MG/1
TABLET ORAL
Qty: 90 TABLET | Refills: 1 | OUTPATIENT
Start: 2022-02-21

## 2022-03-02 RX ORDER — AMLODIPINE BESYLATE 5 MG/1
TABLET ORAL
Qty: 90 TABLET | Refills: 1 | Status: SHIPPED
Start: 2022-03-02 | End: 2022-08-11

## 2022-04-13 ENCOUNTER — OUTSIDE SERVICES (OUTPATIENT)
Dept: FAMILY MEDICINE CLINIC | Age: 87
End: 2022-04-13
Payer: MEDICARE

## 2022-04-13 DIAGNOSIS — M25.561 ACUTE PAIN OF RIGHT KNEE: Primary | ICD-10-CM

## 2022-04-13 DIAGNOSIS — R60.9 PERIPHERAL EDEMA: ICD-10-CM

## 2022-04-13 DIAGNOSIS — M81.0 AGE-RELATED OSTEOPOROSIS WITHOUT CURRENT PATHOLOGICAL FRACTURE: ICD-10-CM

## 2022-04-13 DIAGNOSIS — I10 ESSENTIAL HYPERTENSION: ICD-10-CM

## 2022-04-13 DIAGNOSIS — K58.2 IRRITABLE BOWEL SYNDROME WITH BOTH CONSTIPATION AND DIARRHEA: ICD-10-CM

## 2022-04-13 NOTE — PROGRESS NOTES
4/13/2022    Josee   1935    This resident is being seen today for a follow-up visit. 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  She is a 82 y.o. female resident who is being seen today for a 1 week follow-up with respect to her right knee. She has been following accordingly with Dr. Samantha Zarco and has been on meloxicam with a recommended duration of 30 days. She indicates that she only has a week to 2 weeks left and she feels that she has had significant improvement with respect to the pain and swelling. She does indicate that she had changed her lisinopril due to hypertensive etiology and she has stopped taking her hydrochlorothiazide because she was unsure as to whether to continue forth. She does however note that she has had increased edema over the course the last several days. She currently denies any headaches or dizziness, sore throat, chest pain, coughing or shortness of breath, nausea or vomiting, constipation or diarrhea, dysuria or frequency, fever or chills, falls or syncopal events.           Medications:  Lisinopril 20 mg 1 twice daily  Amlodipine besylate 5 mg daily   aspirin 81 mg daily  Calcium 600 mg with vitamin D daily  Prolia 60 mg every 6 months   Gabapentin 300 mg twice daily  Vitamin D 1000 units daily  MiraLAX 17 g daily  Lactulose 10 g per 15 mL with 1 tablespoon daily  Melatonin 6 mg at bedtime as needed  MiraLAX 17 g daily  Hydrochlorothiazide 25 mg daily  Zioptan 0.0015% 1 drop both eyes daily            Objective     Vital Signs: /76 heart rate 76 respirations 18 temperature 97.2 SPO2 100 % weight 124.0 pounds        Physical examination:Skin is essentially warm and dry. Surgical scar noted to anterior aspect of right knee. She does have a palpable lump noted to the inner aspect of her left lower extremity which is nonmovable. HEENT unremarkable. Neck is supple. Heart regular rate and rhythm. No rubs, gallops or murmurs noted. Lungs are clear to auscultation. No evidence of rhonchi, rales, or wheezing. Abdomen is soft, supple and non-tender. Bowel sounds are noted x4 quadrants. No rigidity, guarding or rebound tenderness. Negative Perkins's, negative McBurney's, negative Harinder's. Extremities; she does have some degree of residual edema to the bilateral lower extremities. Pulses are adequate. No clubbing  or no cyanosis noted. She does have some arthritic deformities most notably to the right knee. Neurologically she  is alert and oriented x3. No evidence of paralysis or paresthesias noted. Diagnoses and all orders for this visit:    Acute pain of right knee  Comments:  Improvement with respect to meloxicam.    Essential hypertension  Comments:  Questionable control with upward titration of lisinopril with additional benefit of hydrochlorothiazide with recommendations to monitor twice daily    Peripheral edema  Comments:  Reinitiate HCTZ 25 mg daily    Irritable bowel syndrome with both constipation and diarrhea  Comments:  Currently stable with MiraLAX and lactulose    Age-related osteoporosis without current pathological fracture  Comments:  Stable with calcium and Prolia          Plan:  Plan of care was discussed with the healthcare team with medications and labs reviewed. BUN/creatinine 29/1.17 with a GFR 42 H/H 11.3/32.7 vitamin D at 30 iron studies within normal limits. Resident has been monitoring her blood pressures at home and have been between 117-147/48-79.   I did recommend that the resident restart her hydrochlorothiazide given the fact that she is having increasing edema to the bilateral lower extremities and furthermore maintain the lisinopril as recommended. I encouraged her to continue forth with close observation of her blood pressure management, by monitoring it twice daily in the home setting. She already has a follow-up appointment in the course of the next 3 to 4 weeks with Dr. Aliyah Pineda. She is can follow-up with me in 3 months and have blood work completed prior to that date with the inclusion of a CBC, CMP, iron studies and a vitamin D level. She will continue with her current management as otherwise stated and I will track her intakes, monitor her weights and behaviors, and see her routinely and as needed with further orders forthcoming. GIOVANNA NEWSOME, APRN - CNP      *Note was creating using voice recognition software.   The document was reviewed however grammatical errors may exist.

## 2022-08-11 RX ORDER — AMLODIPINE BESYLATE 5 MG/1
TABLET ORAL
Qty: 90 TABLET | Refills: 1 | Status: SHIPPED | OUTPATIENT
Start: 2022-08-11

## 2022-08-18 ENCOUNTER — OUTSIDE SERVICES (OUTPATIENT)
Dept: FAMILY MEDICINE CLINIC | Age: 87
End: 2022-08-18
Payer: MEDICARE

## 2022-08-18 DIAGNOSIS — M54.50 CHRONIC RIGHT-SIDED LOW BACK PAIN WITHOUT SCIATICA: Primary | ICD-10-CM

## 2022-08-18 DIAGNOSIS — I10 ESSENTIAL HYPERTENSION: ICD-10-CM

## 2022-08-18 DIAGNOSIS — E78.2 MIXED HYPERLIPIDEMIA: ICD-10-CM

## 2022-08-18 DIAGNOSIS — R53.83 OTHER FATIGUE: ICD-10-CM

## 2022-08-18 DIAGNOSIS — G89.29 CHRONIC RIGHT-SIDED LOW BACK PAIN WITHOUT SCIATICA: Primary | ICD-10-CM

## 2022-08-18 DIAGNOSIS — R26.9 GAIT DISTURBANCE: ICD-10-CM

## 2022-08-18 NOTE — PROGRESS NOTES
temperature 97.0 SPO2 98 % weight 122.8 pounds        Physical examination:Skin is essentially warm and dry. Surgical scar noted to anterior aspect of right knee. She does have a palpable lump noted to the inner aspect of her left lower extremity which is nonmovable. HEENT unremarkable. Neck is supple. Heart regular rate and rhythm. No rubs, gallops or murmurs noted. Lungs are clear to auscultation. No evidence of rhonchi, rales, or wheezing. Abdomen is soft, supple and non-tender. Bowel sounds are noted x4 quadrants. No rigidity, guarding or rebound tenderness. Negative Perkins's, negative McBurney's, negative Harinder's. Extremities; she does have some degree of residual edema to the bilateral lower extremities. Pulses are adequate. No clubbing  or no cyanosis noted. She does have some arthritic deformities most notably to the right knee. Examination of the back does have evidence of dorsal scoliosis to the right. Neurologically she  is alert and oriented x3. No evidence of paralysis or paresthesias noted. Diagnoses and all orders for this visit:    Chronic right-sided low back pain without sciatica  Comments:  Possibly secondary to underlying scoliosis with recommendations for heat/ice and utilization of Aleve as needed    Essential hypertension  Comments:  Maintain lisinopril with amlodipine and initiate hydrochlorothiazide daily    Gait disturbance  Comments:  Utilizes a cane as needed and refuses therapy services    Other fatigue  Comments:  Obtain B12 with TSH and a repeat CMP    Mixed hyperlipidemia  Comments:  Maintain low-dose aspirin          Plan:  Plan of care was discussed with the healthcare team with medications and labs reviewed. BUNs/creatinine 16/0.94 with a GFR of 50 5H/H11.6/34.2 with a vitamin D of 41.   Regarding her question of fatigue, I will recommend that we obtain some blood work including a B12 level and a TSH and I will recheck her CMP at that time due to her history of hyponatremia. I do feel a lot of her pain in her lower back area is related to her scoliosis. We did discuss the application of heat versus ice and utilization of Aleve to help with her intermittent pain. I did also recommend physical therapy services given her history/concern of her walking, but she did politely declined at this time. She states that she will continue to use her cane as needed. I am concerned about her blood pressure management and she did indicate that she had not been taking hydrochlorothiazide on a daily basis and only as needed intermittently. I did therefore recommend that she start the hydrochlorothiazide daily and that we would plan to evaluate her in 2 weeks to recheck her blood pressure. I will otherwise give recommendations for refill of her medications including lisinopril and gabapentin with 180 give each and 3 refills. She will otherwise continue with her current management as directed and I will see her routinely and as needed with further orders forthcoming. GIOVANNA NEWSOME, APRN - CNP      *Note was creating using voice recognition software.   The document was reviewed however grammatical errors may exist.

## 2022-09-01 ENCOUNTER — OUTSIDE SERVICES (OUTPATIENT)
Dept: FAMILY MEDICINE CLINIC | Age: 87
End: 2022-09-01
Payer: MEDICARE

## 2022-09-01 DIAGNOSIS — I10 ESSENTIAL HYPERTENSION: ICD-10-CM

## 2022-09-01 DIAGNOSIS — K59.00 CONSTIPATION, UNSPECIFIED CONSTIPATION TYPE: ICD-10-CM

## 2022-09-01 DIAGNOSIS — G47.00 INSOMNIA, UNSPECIFIED TYPE: ICD-10-CM

## 2022-09-01 DIAGNOSIS — F32.A DEPRESSION, UNSPECIFIED DEPRESSION TYPE: Primary | ICD-10-CM

## 2022-09-01 DIAGNOSIS — I63.9 CEREBROVASCULAR ACCIDENT (CVA), UNSPECIFIED MECHANISM (HCC): ICD-10-CM

## 2022-09-01 NOTE — PROGRESS NOTES
9/1/2022    Kathia Guadalupe  1935    This resident is being seen today for a follow-up visit. 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  She is a 82 y.o. female resident who is being seen today for a follow-up evaluation with resident seen as a 2-week follow-up regarding elevated blood pressure. I had placed her on low-dose hydrochlorothiazide at that time, which does appear to be effective. She still indicates that she \"cannot walk a straight line. \"  She is however adamant that she does not want any therapy services in this regard. She did bring to my attention that she remains relatively tired and I did do recent lab work-up in terms of a TSH and B12 which were within normal limits. She also states that she has not been as active as she was prior to Jorge Atian and I do feel that some of her symptoms are consistent with depression. She was at first very hesitant to admitting depression and had indicated that she did not want any further medications, but was willing to discuss it further. She denies any headaches or dizziness, sore throat, chest pain, coughing or shortness of breath, nausea or vomiting, constipation or diarrhea, dysuria or frequency, fever or chills, falls or syncopal events.             Medications:  Lisinopril 20 mg 1 twice daily  Amlodipine besylate 5 mg daily   aspirin 81 mg daily  Calcium 600 mg with vitamin D daily  Prolia 60 mg every 6 months   Gabapentin 300 mg twice daily  Vitamin D 1000 units daily  MiraLAX 17 g daily  Lactulose 10 g per 15 mL with 1 tablespoon daily  Melatonin 6 mg at bedtime as needed  MiraLAX 17 g daily  Hydrochlorothiazide 25 mg daily  Zioptan 0.0015% 1 drop both eyes daily  Remeron 7.5 mg at bedtime            Objective     Vital Signs: /60 heart rate 66 with respirations 20 temperature 96.9 SPO2 96% weight 123.4 pounds        Physical examination:Skin is essentially warm and dry. Surgical scar noted to anterior aspect of right knee. She does have a palpable lump noted to the inner aspect of her left lower extremity which is nonmovable. HEENT unremarkable. Neck is supple. Heart regular rate and rhythm. No rubs, gallops or murmurs noted. Lungs are clear to auscultation. No evidence of rhonchi, rales, or wheezing. Abdomen is soft, supple and non-tender. Bowel sounds are noted x4 quadrants. No rigidity, guarding or rebound tenderness. Negative Perkins's, negative McBurney's, negative Harinder's. Extremities; she does have some degree of residual edema to the bilateral lower extremities. Pulses are adequate. No clubbing  or no cyanosis noted. She does have some arthritic deformities most notably to the right knee. Examination of the back does have evidence of dorsal scoliosis to the right. Neurologically she  is alert and oriented x3. No evidence of paralysis or paresthesias noted. Diagnoses and all orders for this visit:    Depression, unspecified depression type  Comments:  Initiate low-dose Remeron 7.5 mg at bedtime    Essential hypertension  Comments:  Controlled with low-dose aspirin, lisinopril and amlodipine    Constipation, unspecified constipation type  Comments:  Controlled with MiraLAX and lactulose    Insomnia, unspecified type  Comments:  Currently controlled with melatonin    Cerebrovascular accident (CVA), unspecified mechanism ([address]  Comments:  Stable with low-dose aspirin with lipid and blood pressure management            Plan:  Plan of care was discussed with the healthcare team with medications and labs reviewed. Her blood pressure does appear to be improved with the benefit of low-dose hydrochlorothiazide.   I will therefore continue with this current recommendations. After further discussion with her regarding the depression, she was willing to try a low-dose medication to help with her symptoms. I will therefore start half dose of Remeron of 7.5 mg at bedtime and then reevaluate her in the course of 1 month. Prior to her visit, she will have the benefit of a BMP. As stated, she refuses any therapy despite her concerns of her ambulation. We will therefore continue with close observation in this regard. She will otherwise continue with her current management and I will see her routinely and as needed with further orders forthcoming. GIOVANNA NEWSOME, APRN - CNP      *Note was creating using voice recognition software.   The document was reviewed however grammatical errors may exist.

## 2022-12-20 ENCOUNTER — OUTSIDE SERVICES (OUTPATIENT)
Dept: FAMILY MEDICINE CLINIC | Age: 87
End: 2022-12-20
Payer: MEDICARE

## 2022-12-20 DIAGNOSIS — K58.2 IRRITABLE BOWEL SYNDROME WITH BOTH CONSTIPATION AND DIARRHEA: ICD-10-CM

## 2022-12-20 DIAGNOSIS — E78.2 MIXED HYPERLIPIDEMIA: ICD-10-CM

## 2022-12-20 DIAGNOSIS — I10 ESSENTIAL HYPERTENSION: ICD-10-CM

## 2022-12-20 DIAGNOSIS — M54.50 LUMBAR PAIN: Primary | ICD-10-CM

## 2022-12-20 DIAGNOSIS — E87.1 ACUTE HYPONATREMIA: ICD-10-CM

## 2022-12-20 DIAGNOSIS — M81.0 AGE-RELATED OSTEOPOROSIS WITHOUT CURRENT PATHOLOGICAL FRACTURE: ICD-10-CM

## 2022-12-22 LAB
ALBUMIN SERPL-MCNC: 4.3 G/DL (ref 3.5–5.2)
ALP BLD-CCNC: 37 U/L (ref 35–104)
ALT SERPL-CCNC: 12 U/L (ref 0–32)
ANION GAP SERPL CALCULATED.3IONS-SCNC: 17 MMOL/L (ref 7–16)
AST SERPL-CCNC: 23 U/L (ref 0–31)
BASOPHILS ABSOLUTE: 0.07 E9/L (ref 0–0.2)
BASOPHILS RELATIVE PERCENT: 1.2 % (ref 0–2)
BILIRUB SERPL-MCNC: 0.5 MG/DL (ref 0–1.2)
BUN BLDV-MCNC: 25 MG/DL (ref 6–23)
CALCIUM SERPL-MCNC: 9.9 MG/DL (ref 8.6–10.2)
CHLORIDE BLD-SCNC: 92 MMOL/L (ref 98–107)
CO2: 21 MMOL/L (ref 22–29)
CREAT SERPL-MCNC: 0.9 MG/DL (ref 0.5–1)
EOSINOPHILS ABSOLUTE: 0.13 E9/L (ref 0.05–0.5)
EOSINOPHILS RELATIVE PERCENT: 2.2 % (ref 0–6)
GFR SERPL CREATININE-BSD FRML MDRD: >60 ML/MIN/1.73
GLUCOSE BLD-MCNC: 92 MG/DL (ref 74–99)
HCT VFR BLD CALC: 35.3 % (ref 34–48)
HEMOGLOBIN: 11.9 G/DL (ref 11.5–15.5)
IMMATURE GRANULOCYTES #: 0.02 E9/L
IMMATURE GRANULOCYTES %: 0.3 % (ref 0–5)
LYMPHOCYTES ABSOLUTE: 1.52 E9/L (ref 1.5–4)
LYMPHOCYTES RELATIVE PERCENT: 25.7 % (ref 20–42)
MCH RBC QN AUTO: 31.4 PG (ref 26–35)
MCHC RBC AUTO-ENTMCNC: 33.7 % (ref 32–34.5)
MCV RBC AUTO: 93.1 FL (ref 80–99.9)
MONOCYTES ABSOLUTE: 0.82 E9/L (ref 0.1–0.95)
MONOCYTES RELATIVE PERCENT: 13.9 % (ref 2–12)
NEUTROPHILS ABSOLUTE: 3.35 E9/L (ref 1.8–7.3)
NEUTROPHILS RELATIVE PERCENT: 56.7 % (ref 43–80)
PDW BLD-RTO: 13.6 FL (ref 11.5–15)
PLATELET # BLD: 231 E9/L (ref 130–450)
PMV BLD AUTO: 10.5 FL (ref 7–12)
POTASSIUM SERPL-SCNC: 4.5 MMOL/L (ref 3.5–5)
RBC # BLD: 3.79 E12/L (ref 3.5–5.5)
SODIUM BLD-SCNC: 130 MMOL/L (ref 132–146)
TOTAL PROTEIN: 6.8 G/DL (ref 6.4–8.3)
TSH SERPL DL<=0.05 MIU/L-ACNC: 1.88 UIU/ML (ref 0.27–4.2)
VITAMIN D 25-HYDROXY: 52 NG/ML (ref 30–100)
WBC # BLD: 5.9 E9/L (ref 4.5–11.5)

## 2022-12-29 PROBLEM — E78.2 MIXED HYPERLIPIDEMIA: Status: ACTIVE | Noted: 2022-12-29

## 2022-12-29 PROBLEM — E87.1 ACUTE HYPONATREMIA: Status: ACTIVE | Noted: 2022-12-29

## 2022-12-29 PROBLEM — M81.0 AGE-RELATED OSTEOPOROSIS WITHOUT CURRENT PATHOLOGICAL FRACTURE: Status: ACTIVE | Noted: 2022-12-29

## 2023-01-26 ENCOUNTER — OUTSIDE SERVICES (OUTPATIENT)
Dept: FAMILY MEDICINE CLINIC | Age: 88
End: 2023-01-26

## 2023-01-26 ENCOUNTER — TELEPHONE (OUTPATIENT)
Dept: PRIMARY CARE CLINIC | Age: 88
End: 2023-01-26

## 2023-01-26 DIAGNOSIS — G62.9 NEUROPATHY: ICD-10-CM

## 2023-01-26 DIAGNOSIS — M54.50 LUMBAR PAIN: ICD-10-CM

## 2023-01-26 DIAGNOSIS — M25.551 RIGHT HIP PAIN: Primary | ICD-10-CM

## 2023-01-26 DIAGNOSIS — R53.83 LACK OF ENERGY: ICD-10-CM

## 2023-01-26 DIAGNOSIS — G47.00 INSOMNIA, UNSPECIFIED TYPE: ICD-10-CM

## 2023-01-26 NOTE — TELEPHONE ENCOUNTER
I spoke with Britt Medley From  [facility]- she's wanting recent x-ray results. I advised her that her last x-rays were from 2021.   She said she smith try the hospital for results

## 2023-01-26 NOTE — PROGRESS NOTES
1/26/2023    Patrick Ruvalcaba  1935    This resident is being seen today for a follow-up visit. 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  She is a 82 y.o. female resident who is being seen today for a follow-up visit with resident recently seen in conjunction with Dr. Licha Ervin and was having some degree of lumbar pain, which she describes as being in her back and her right hip. She states that he told her that it was sacroiliac and she was given the benefit of an injection of Decadron and states that she still has some ongoing pain. She does indicate that its mostly when she is in the sitting position. She furthermore states that she was having some problem with her feet but states that she has plans to see Dr. Lisa Zuleta in this regard. She does continue to have bowel issues on and off with constipation/diarrhea and has had a history of an endoscopy and colonoscopy does maintain the benefit of a high-fiber diet with MiraLAX. She does feel that she has been doing relatively well but feels that she just does not have the same energy she did before. She states that she has to force herself to do anything. She currently denies any headaches or dizziness, sore throat, chest pain, coughing or shortness of breath, nausea or vomiting, constipation or diarrhea, dysuria or frequency, fever or chills, falls or syncopal events.               Medications:  Lisinopril 20 mg 1 twice daily  Amlodipine besylate 5 mg daily   Gabapentin 300 mg 2 daily hydrochlorothiazide 25 mg daily  Aspirin 81 mg daily  Refresh eyedrops  Vitamin D3 2000 units daily  Calcium 600 mg daily  Melatonin 10 mg at nighttime            Objective Vital Signs: /67 heart rate 68 with respirations 22 temperature 97.1 SPO2 99% weight 125 pounds        Physical examination:Skin is essentially warm and dry. Surgical scar noted to anterior aspect of right knee. She does have a palpable lump noted to the inner aspect of her left lower extremity which is nonmovable. HEENT unremarkable. Neck is supple. Heart regular rate and rhythm. No rubs, gallops or murmurs noted. Lungs are clear to auscultation. No evidence of rhonchi, rales, or wheezing. Abdomen is soft, supple and non-tender. Bowel sounds are noted x4 quadrants. No rigidity, guarding or rebound tenderness. Negative Perkins's, negative McBurney's, negative Harinder's. Extremities; she does have some degree of residual edema to the bilateral lower extremities. Pulses are adequate. No clubbing  or no cyanosis noted. She does have some arthritic deformities most notably to the right knee. Examination of the back does have evidence of dorsal scoliosis to the right. Neurologically she  is alert and oriented x3. No evidence of paralysis or paresthesias noted. Diagnoses and all orders for this visit:    Right hip pain  Comments:  X-ray lumbar sacral spine and right hip    Lumbar pain  Comments:  X-ray lumbar sacral spine, right hip and pelvis    Lack of energy  Comments:  Initiate B12 500 mcg daily    Neuropathy  Comments:  Maintain Neurontin    Insomnia, unspecified type  Comments:  Maintain melatonin              Plan:  Plan of care was discussed with the healthcare team with medications and labs reviewed. Given the fact that she does have ongoing pain to her hip and back, I am Marlen recommend x-rays lumbar sacral spine, right hip and her pelvis. I did encourage her to take Aleve every morning and as needed.   Staff is going to obtain any old imaging studies from [facility], with resident stating that she has had them done in times past.  She will also be set up for DEXA scan in the course of the next couple of months with resident requesting that it not be completed until April or May. She will start on 500 mcg of B12 daily due to her lack of energy. I will plan to reevaluate her BMP in the course of 1 month due to underlying hyponatremia with the current level of 131 previously 128. She will furthermore continue with her plan of care and I will see her routinely and as needed with further orders forthcoming. GIOVANNA NEWSOME, APRN - CNP      *Note was creating using voice recognition software.   The document was reviewed however grammatical errors may exist.

## 2023-01-26 NOTE — TELEPHONE ENCOUNTER
----- Message from Solomon Rosalva sent at 1/26/2023  9:45 AM EST -----  Subject: Message to Provider    QUESTIONS  Information for Provider? Berenice from [facility] need patient test   results. Ba [place] can be reached @ [phone]  ---------------------------------------------------------------------------  --------------  Rhoda LUCIANO  [phone]; OK to leave message on voicemail  ---------------------------------------------------------------------------  --------------  SCRIPT ANSWERS  Relationship to Patient? Third Party  Third Party Type?  Other  Other Third Party Type? jose  Representative Name? Stephani Graff

## 2023-02-07 RX ORDER — AMLODIPINE BESYLATE 5 MG/1
TABLET ORAL
Qty: 90 TABLET | Refills: 1 | Status: SHIPPED | OUTPATIENT
Start: 2023-02-07

## 2023-02-27 LAB
ANION GAP SERPL CALCULATED.3IONS-SCNC: 10 MMOL/L (ref 7–16)
BUN BLDV-MCNC: 19 MG/DL (ref 6–23)
CALCIUM SERPL-MCNC: 9.9 MG/DL (ref 8.6–10.2)
CHLORIDE BLD-SCNC: 93 MMOL/L (ref 98–107)
CO2: 26 MMOL/L (ref 22–29)
CREAT SERPL-MCNC: 1.1 MG/DL (ref 0.5–1)
GFR SERPL CREATININE-BSD FRML MDRD: 49 ML/MIN/1.73
GLUCOSE BLD-MCNC: 81 MG/DL (ref 74–99)
POTASSIUM SERPL-SCNC: 4.9 MMOL/L (ref 3.5–5)
SODIUM BLD-SCNC: 129 MMOL/L (ref 132–146)

## 2023-03-07 LAB
ANION GAP SERPL CALCULATED.3IONS-SCNC: 12 MMOL/L (ref 7–16)
BUN BLDV-MCNC: 24 MG/DL (ref 6–23)
CALCIUM SERPL-MCNC: 9.2 MG/DL (ref 8.6–10.2)
CHLORIDE BLD-SCNC: 95 MMOL/L (ref 98–107)
CO2: 24 MMOL/L (ref 22–29)
CREAT SERPL-MCNC: 1 MG/DL (ref 0.5–1)
GFR SERPL CREATININE-BSD FRML MDRD: 55 ML/MIN/1.73
GLUCOSE BLD-MCNC: 80 MG/DL (ref 74–99)
POTASSIUM SERPL-SCNC: 5.3 MMOL/L (ref 3.5–5)
SODIUM BLD-SCNC: 131 MMOL/L (ref 132–146)

## 2023-03-14 LAB
ANION GAP SERPL CALCULATED.3IONS-SCNC: 9 MMOL/L (ref 7–16)
BUN BLDV-MCNC: 20 MG/DL (ref 6–23)
CALCIUM SERPL-MCNC: 9.8 MG/DL (ref 8.6–10.2)
CHLORIDE BLD-SCNC: 97 MMOL/L (ref 98–107)
CO2: 26 MMOL/L (ref 22–29)
CREAT SERPL-MCNC: 0.9 MG/DL (ref 0.5–1)
GFR SERPL CREATININE-BSD FRML MDRD: >60 ML/MIN/1.73
GLUCOSE BLD-MCNC: 82 MG/DL (ref 74–99)
POTASSIUM SERPL-SCNC: 4.9 MMOL/L (ref 3.5–5)
SODIUM BLD-SCNC: 132 MMOL/L (ref 132–146)

## 2023-03-30 ENCOUNTER — OUTSIDE SERVICES (OUTPATIENT)
Dept: FAMILY MEDICINE CLINIC | Age: 88
End: 2023-03-30

## 2023-03-30 DIAGNOSIS — R19.7 DIARRHEA, UNSPECIFIED TYPE: ICD-10-CM

## 2023-03-30 DIAGNOSIS — I63.9 CEREBROVASCULAR ACCIDENT (CVA), UNSPECIFIED MECHANISM (HCC): ICD-10-CM

## 2023-03-30 DIAGNOSIS — E78.2 MIXED HYPERLIPIDEMIA: ICD-10-CM

## 2023-03-30 DIAGNOSIS — R53.83 LACK OF ENERGY: ICD-10-CM

## 2023-03-30 DIAGNOSIS — M79.604 PAIN IN BOTH LOWER EXTREMITIES: Primary | ICD-10-CM

## 2023-03-30 DIAGNOSIS — M79.605 PAIN IN BOTH LOWER EXTREMITIES: Primary | ICD-10-CM

## 2023-03-30 NOTE — PROGRESS NOTES
146/82 heart rate 62 with respirations 19 temperature 96.4 SPO2 97% weight 127.4 pounds        Physical examination:Skin is essentially warm and dry. Surgical scar noted to anterior aspect of right knee. HEENT unremarkable. Neck is supple. Heart regular rate and rhythm. No rubs, gallops or murmurs noted. Lungs are clear to auscultation. No evidence of rhonchi, rales, or wheezing. Abdomen is soft, supple and non-tender. Bowel sounds are noted x4 quadrants. No rigidity, guarding or rebound tenderness. Negative Prekins's, negative McBurney's, negative Harinder's. Extremities; she does have some degree of residual edema to the bilateral lower extremities. Pulses are adequate. No clubbing  or no cyanosis noted. She does have some arthritic deformities most notably to the right knee. Examination of the back does have evidence of dorsal scoliosis to the right. Neurologically she  is alert and oriented x3. No evidence of paralysis or paresthesias noted. Diagnoses and all orders for this visit:    Pain in both lower extremities  Comments:  Maintain utilization of RIKKI hose    Lack of energy  Comments:  Obtain a CBC, CMP, TSH, B12 level and iron studies in the morning    Cerebrovascular accident (CVA), unspecified mechanism ([facility])  Comments:  Continue with low-dose aspirin with lipid and blood pressure management    Diarrhea, unspecified type  Comments:  Intermittent in nature    Mixed hyperlipidemia  Comments:  Currently controlled with low-dose aspirin with observation of lipid panel                Plan:  Plan of care was discussed with the healthcare team with medications and labs reviewed. Sodium 132 potassium 4.9 BUNs/creatinine 20/0.9 with a GFR greater than 60 most recent H/H of 11.9/35.3. It is of note to mention that she had been somewhat hyponatremic in February and the labs were corrected and she states that she was monitoring her fluid intake.   She feels that she has been doing well in this regard and

## 2023-03-31 LAB
ALBUMIN SERPL-MCNC: 3.5 G/DL (ref 3.5–5.2)
ALP SERPL-CCNC: 46 U/L (ref 35–104)
ALT SERPL-CCNC: 11 U/L (ref 0–32)
ANION GAP SERPL CALCULATED.3IONS-SCNC: 15 MMOL/L (ref 7–16)
AST SERPL-CCNC: 25 U/L (ref 0–31)
BASOPHILS # BLD: 0.07 E9/L (ref 0–0.2)
BASOPHILS NFR BLD: 1.6 % (ref 0–2)
BILIRUB SERPL-MCNC: 0.3 MG/DL (ref 0–1.2)
BUN SERPL-MCNC: 23 MG/DL (ref 6–23)
CALCIUM SERPL-MCNC: 9.8 MG/DL (ref 8.6–10.2)
CHLORIDE SERPL-SCNC: 96 MMOL/L (ref 98–107)
CO2 SERPL-SCNC: 21 MMOL/L (ref 22–29)
CREAT SERPL-MCNC: 1 MG/DL (ref 0.5–1)
EOSINOPHIL # BLD: 0.2 E9/L (ref 0.05–0.5)
EOSINOPHIL NFR BLD: 4.7 % (ref 0–6)
ERYTHROCYTE [DISTWIDTH] IN BLOOD BY AUTOMATED COUNT: 13.2 FL (ref 11.5–15)
GLUCOSE SERPL-MCNC: 77 MG/DL (ref 74–99)
HCT VFR BLD AUTO: 31.8 % (ref 34–48)
HGB BLD-MCNC: 10.3 G/DL (ref 11.5–15.5)
IMM GRANULOCYTES # BLD: 0.01 E9/L
IMM GRANULOCYTES NFR BLD: 0.2 % (ref 0–5)
IRON SATN MFR SERPL: 26 % (ref 15–50)
IRON SERPL-MCNC: 80 MCG/DL (ref 37–145)
LYMPHOCYTES # BLD: 1.49 E9/L (ref 1.5–4)
LYMPHOCYTES NFR BLD: 34.9 % (ref 20–42)
MCH RBC QN AUTO: 31.1 PG (ref 26–35)
MCHC RBC AUTO-ENTMCNC: 32.4 % (ref 32–34.5)
MCV RBC AUTO: 96.1 FL (ref 80–99.9)
MONOCYTES # BLD: 0.68 E9/L (ref 0.1–0.95)
MONOCYTES NFR BLD: 15.9 % (ref 2–12)
NEUTROPHILS # BLD: 1.82 E9/L (ref 1.8–7.3)
NEUTS SEG NFR BLD: 42.7 % (ref 43–80)
PLATELET # BLD AUTO: 234 E9/L (ref 130–450)
PMV BLD AUTO: 11 FL (ref 7–12)
POTASSIUM SERPL-SCNC: 4.9 MMOL/L (ref 3.5–5)
PROT SERPL-MCNC: 6.1 G/DL (ref 6.4–8.3)
RBC # BLD AUTO: 3.31 E12/L (ref 3.5–5.5)
SODIUM SERPL-SCNC: 132 MMOL/L (ref 132–146)
TIBC SERPL-MCNC: 312 MCG/DL (ref 250–450)
TSH SERPL-MCNC: 2.9 UIU/ML (ref 0.27–4.2)
VIT B12 SERPL-MCNC: 496 PG/ML (ref 211–946)
VITAMIN D 25-HYDROXY: 45 NG/ML (ref 30–100)
WBC # BLD: 4.3 E9/L (ref 4.5–11.5)

## 2023-05-08 LAB
BASOPHILS # BLD: 0.07 E9/L (ref 0–0.2)
BASOPHILS NFR BLD: 1.3 % (ref 0–2)
EOSINOPHIL # BLD: 0.2 E9/L (ref 0.05–0.5)
EOSINOPHIL NFR BLD: 3.7 % (ref 0–6)
ERYTHROCYTE [DISTWIDTH] IN BLOOD BY AUTOMATED COUNT: 13.1 FL (ref 11.5–15)
HCT VFR BLD AUTO: 32.2 % (ref 34–48)
HGB BLD-MCNC: 10.2 G/DL (ref 11.5–15.5)
IMM GRANULOCYTES # BLD: 0.01 E9/L
IMM GRANULOCYTES NFR BLD: 0.2 % (ref 0–5)
LYMPHOCYTES # BLD: 1.7 E9/L (ref 1.5–4)
LYMPHOCYTES NFR BLD: 31.8 % (ref 20–42)
MCH RBC QN AUTO: 31.3 PG (ref 26–35)
MCHC RBC AUTO-ENTMCNC: 31.7 % (ref 32–34.5)
MCV RBC AUTO: 98.8 FL (ref 80–99.9)
MONOCYTES # BLD: 0.8 E9/L (ref 0.1–0.95)
MONOCYTES NFR BLD: 15 % (ref 2–12)
NEUTROPHILS # BLD: 2.56 E9/L (ref 1.8–7.3)
NEUTS SEG NFR BLD: 48 % (ref 43–80)
PLATELET # BLD AUTO: 206 E9/L (ref 130–450)
PMV BLD AUTO: 10.5 FL (ref 7–12)
RBC # BLD AUTO: 3.26 E12/L (ref 3.5–5.5)
WBC # BLD: 5.3 E9/L (ref 4.5–11.5)

## 2023-06-29 ENCOUNTER — OUTSIDE SERVICES (OUTPATIENT)
Dept: FAMILY MEDICINE CLINIC | Age: 88
End: 2023-06-29

## 2023-06-29 DIAGNOSIS — I10 ESSENTIAL HYPERTENSION: ICD-10-CM

## 2023-06-29 DIAGNOSIS — M81.0 AGE-RELATED OSTEOPOROSIS WITHOUT CURRENT PATHOLOGICAL FRACTURE: ICD-10-CM

## 2023-06-29 DIAGNOSIS — L82.1 SEBORRHEIC KERATOSIS: ICD-10-CM

## 2023-06-29 DIAGNOSIS — G89.29 CHRONIC RIGHT SHOULDER PAIN: Primary | ICD-10-CM

## 2023-06-29 DIAGNOSIS — M25.561 ACUTE PAIN OF RIGHT KNEE: ICD-10-CM

## 2023-06-29 DIAGNOSIS — M25.511 CHRONIC RIGHT SHOULDER PAIN: Primary | ICD-10-CM

## 2023-09-06 RX ORDER — AMLODIPINE BESYLATE 5 MG/1
TABLET ORAL
Qty: 90 TABLET | Refills: 1 | Status: SHIPPED | OUTPATIENT
Start: 2023-09-06

## 2023-09-20 LAB
25(OH)D3 SERPL-MCNC: 43.8 NG/ML (ref 30–100)
ALBUMIN SERPL-MCNC: 3.6 G/DL (ref 3.5–5.2)
ALP SERPL-CCNC: 37 U/L (ref 35–104)
ALT SERPL-CCNC: 10 U/L (ref 0–32)
ANION GAP SERPL CALCULATED.3IONS-SCNC: 15 MMOL/L (ref 7–16)
AST SERPL-CCNC: 23 U/L (ref 0–31)
BASOPHILS # BLD: 0.06 K/UL (ref 0–0.2)
BASOPHILS NFR BLD: 1 % (ref 0–2)
BILIRUB SERPL-MCNC: 0.3 MG/DL (ref 0–1.2)
BUN SERPL-MCNC: 20 MG/DL (ref 6–23)
CALCIUM SERPL-MCNC: 9 MG/DL (ref 8.6–10.2)
CHLORIDE SERPL-SCNC: 99 MMOL/L (ref 98–107)
CHOLEST SERPL-MCNC: 148 MG/DL
CO2 SERPL-SCNC: 21 MMOL/L (ref 22–29)
CREAT SERPL-MCNC: 1 MG/DL (ref 0.5–1)
EOSINOPHIL # BLD: 0.16 K/UL (ref 0.05–0.5)
EOSINOPHILS RELATIVE PERCENT: 4 % (ref 0–6)
ERYTHROCYTE [DISTWIDTH] IN BLOOD BY AUTOMATED COUNT: 13.7 % (ref 11.5–15)
GFR SERPL CREATININE-BSD FRML MDRD: 53 ML/MIN/1.73M2
GLUCOSE SERPL-MCNC: 80 MG/DL (ref 74–99)
HCT VFR BLD AUTO: 30.2 % (ref 34–48)
HDLC SERPL-MCNC: 78 MG/DL
HGB BLD-MCNC: 9.9 G/DL (ref 11.5–15.5)
IMM GRANULOCYTES # BLD AUTO: <0.03 K/UL (ref 0–0.58)
IMM GRANULOCYTES NFR BLD: 0 % (ref 0–5)
LDLC SERPL CALC-MCNC: 62 MG/DL
LYMPHOCYTES NFR BLD: 1.62 K/UL (ref 1.5–4)
LYMPHOCYTES RELATIVE PERCENT: 35 % (ref 20–42)
MCH RBC QN AUTO: 31.8 PG (ref 26–35)
MCHC RBC AUTO-ENTMCNC: 32.8 G/DL (ref 32–34.5)
MCV RBC AUTO: 97.1 FL (ref 80–99.9)
MONOCYTES NFR BLD: 0.72 K/UL (ref 0.1–0.95)
MONOCYTES NFR BLD: 16 % (ref 2–12)
NEUTROPHILS NFR BLD: 44 % (ref 43–80)
NEUTS SEG NFR BLD: 2.04 K/UL (ref 1.8–7.3)
PLATELET # BLD AUTO: 223 K/UL (ref 130–450)
PMV BLD AUTO: 10.8 FL (ref 7–12)
POTASSIUM SERPL-SCNC: 5.1 MMOL/L (ref 3.5–5)
PROT SERPL-MCNC: 5.5 G/DL (ref 6.4–8.3)
RBC # BLD AUTO: 3.11 M/UL (ref 3.5–5.5)
SODIUM SERPL-SCNC: 135 MMOL/L (ref 132–146)
TRIGL SERPL-MCNC: 38 MG/DL
VLDLC SERPL CALC-MCNC: 8 MG/DL
WBC OTHER # BLD: 4.6 K/UL (ref 4.5–11.5)

## 2023-09-21 ENCOUNTER — OUTSIDE SERVICES (OUTPATIENT)
Dept: FAMILY MEDICINE CLINIC | Age: 88
End: 2023-09-21

## 2023-09-21 DIAGNOSIS — M81.0 OSTEOPOROSIS WITHOUT CURRENT PATHOLOGICAL FRACTURE, UNSPECIFIED OSTEOPOROSIS TYPE: ICD-10-CM

## 2023-09-21 DIAGNOSIS — G47.00 INSOMNIA, UNSPECIFIED TYPE: ICD-10-CM

## 2023-09-21 DIAGNOSIS — E78.2 MIXED HYPERLIPIDEMIA: ICD-10-CM

## 2023-09-21 DIAGNOSIS — R11.0 NAUSEA: Primary | ICD-10-CM

## 2023-09-21 DIAGNOSIS — I63.9 CEREBROVASCULAR ACCIDENT (CVA), UNSPECIFIED MECHANISM (HCC): ICD-10-CM

## 2023-09-21 NOTE — PROGRESS NOTES
Vital Signs: /68 heart rate 60  respirations 18 temperature 98.2 SPO2 100% weight 126.8 pounds        Physical examination:Skin is essentially warm and dry. She does have what appears to be some degree of seborrheic keratosis to her left cheek, which was recently frozen per Department of dermatology. Surgical scar noted to anterior aspect of right knee. HEENT unremarkable. Neck is supple. Heart regular rate and rhythm. No rubs, gallops or murmurs noted. Lungs are clear to auscultation. No evidence of rhonchi, rales, or wheezing. Abdomen is soft, supple and non-tender. Bowel sounds are noted x4 quadrants. No rigidity, guarding or rebound tenderness. Negative Perkins's, negative McBurney's, negative Harinder's. Extremities; she does have some degree of residual edema to the bilateral lower extremities. Pulses are adequate. No clubbing  or no cyanosis noted. She does have some arthritic deformities most notably to the right knee. Examination of the back does have evidence of dorsal scoliosis to the right. Neurologically she  is alert and oriented x3. No evidence of paralysis or paresthesias noted. Diagnoses and all orders for this visit:    Nausea  Comments:  Restart Carafate 1 g twice daily and follow-up in the clinic if no improvement    Osteoporosis without current pathological fracture, unspecified osteoporosis type  Comments:  Set up for calcium testing due to infusion of Prolia in October    Cerebrovascular accident (CVA), unspecified mechanism ([address])  Comments:  Maintain low-dose aspirin with lipid and blood pressure management    Mixed hyperlipidemia  Comments:  Controlled with aspirin with cholesterol 148    Insomnia, unspecified type  Comments:  Controlled with melatonin                      Plan:  Plan of care was discussed with the healthcare team with medications and labs reviewed. H/H 9.9/30.2 BUNs/creatinine 20/1 with a GFR of 53 cholesterol 148 LDL 62.   As per my discussion with

## 2023-11-01 ENCOUNTER — OUTSIDE SERVICES (OUTPATIENT)
Dept: FAMILY MEDICINE CLINIC | Age: 88
End: 2023-11-01

## 2023-11-01 DIAGNOSIS — F32.A DEPRESSION, UNSPECIFIED DEPRESSION TYPE: ICD-10-CM

## 2023-11-01 DIAGNOSIS — R19.7 DIARRHEA, UNSPECIFIED TYPE: Primary | ICD-10-CM

## 2023-11-01 DIAGNOSIS — I10 ESSENTIAL HYPERTENSION: ICD-10-CM

## 2023-11-01 DIAGNOSIS — G47.00 INSOMNIA, UNSPECIFIED TYPE: ICD-10-CM

## 2023-11-01 DIAGNOSIS — I63.9 CEREBROVASCULAR ACCIDENT (CVA), UNSPECIFIED MECHANISM (HCC): ICD-10-CM

## 2023-11-02 NOTE — PROGRESS NOTES
with further orders forthcoming. GIOVANNA NEWSOME, APRN - CNP      *Note was creating using voice recognition software.   The document was reviewed however grammatical errors may exist.

## 2023-11-06 LAB
BASOPHILS # BLD: 0.04 K/UL (ref 0–0.2)
BASOPHILS NFR BLD: 1 % (ref 0–2)
CALCIUM SERPL-MCNC: 9.7 MG/DL (ref 8.6–10.2)
EOSINOPHIL # BLD: 0.11 K/UL (ref 0.05–0.5)
EOSINOPHILS RELATIVE PERCENT: 3 % (ref 0–6)
ERYTHROCYTE [DISTWIDTH] IN BLOOD BY AUTOMATED COUNT: 13.6 % (ref 11.5–15)
HCT VFR BLD AUTO: 33.6 % (ref 34–48)
HGB BLD-MCNC: 11.1 G/DL (ref 11.5–15.5)
IMM GRANULOCYTES # BLD AUTO: <0.03 K/UL (ref 0–0.58)
IMM GRANULOCYTES NFR BLD: 1 % (ref 0–5)
IRON SATN MFR SERPL: 25 % (ref 15–50)
IRON SERPL-MCNC: 71 UG/DL (ref 37–145)
LYMPHOCYTES NFR BLD: 1.51 K/UL (ref 1.5–4)
LYMPHOCYTES RELATIVE PERCENT: 35 % (ref 20–42)
MCH RBC QN AUTO: 31.7 PG (ref 26–35)
MCHC RBC AUTO-ENTMCNC: 33 G/DL (ref 32–34.5)
MCV RBC AUTO: 96 FL (ref 80–99.9)
MONOCYTES NFR BLD: 0.63 K/UL (ref 0.1–0.95)
MONOCYTES NFR BLD: 15 % (ref 2–12)
NEUTROPHILS NFR BLD: 47 % (ref 43–80)
NEUTS SEG NFR BLD: 2.03 K/UL (ref 1.8–7.3)
PLATELET # BLD AUTO: 217 K/UL (ref 130–450)
PMV BLD AUTO: 10.2 FL (ref 7–12)
RBC # BLD AUTO: 3.5 M/UL (ref 3.5–5.5)
TIBC SERPL-MCNC: 286 UG/DL (ref 250–450)
VIT B12 SERPL-MCNC: 600 PG/ML (ref 211–946)
WBC OTHER # BLD: 4.3 K/UL (ref 4.5–11.5)

## 2023-11-30 ENCOUNTER — OUTSIDE SERVICES (OUTPATIENT)
Dept: FAMILY MEDICINE CLINIC | Age: 88
End: 2023-11-30

## 2023-11-30 DIAGNOSIS — R19.7 DIARRHEA, UNSPECIFIED TYPE: ICD-10-CM

## 2023-11-30 DIAGNOSIS — M62.838 MUSCLE SPASMS OF BOTH LOWER EXTREMITIES: Primary | ICD-10-CM

## 2023-11-30 DIAGNOSIS — G62.9 NEUROPATHY: ICD-10-CM

## 2023-11-30 DIAGNOSIS — E78.2 MIXED HYPERLIPIDEMIA: ICD-10-CM

## 2023-11-30 DIAGNOSIS — M81.0 AGE-RELATED OSTEOPOROSIS WITHOUT CURRENT PATHOLOGICAL FRACTURE: ICD-10-CM

## 2023-12-01 NOTE — PROGRESS NOTES
11/30/2023    Janice Arthur  1935    This resident is being seen today for an acute visit. 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  She is a 82 y.o. female resident who is being seen today for an acute visit following a fall on November 16, 2023. She did not go out to the hospital setting at that time but was being treated accordingly with respect to nursing management. She indicates that she hit her entire right side and had significant bruising to the right side of her body. She stated that she had a bleeding lip and eventually followed up with the Department of dentistry to check her bridge. She states now that she has significant discomfort still to her hip shoulder and ankle but most of her problem is regarding spasms to her lower extremity. She denies any coughing or shortness of breath, nausea or vomiting, constipation, fever or chills, syncopal events or seizure activity. She does state that she has had loose bowels which has been an ongoing problem for her and they are now yellow in appearance. She does state that she was seen in conjunction with gastroenterology in times past but she had stopped seeing them because she felt that she had improved.         Medications:  Lisinopril 20 mg 1 twice daily  Amlodipine besylate 5 mg daily   Gabapentin 300 mg 2 daily hydrochlorothiazide 25 mg daily  Aspirin 81 mg daily  Refresh eyedrops  Vitamin D3 2000 units daily  Calcium 600 mg daily  Melatonin 10 mg at nighttime  Acidophilus 1 tablet daily          Objective     Vital Signs: /87 heart rate 67 respirations 16 temperature 97.6 SPO2 99% weight 120.8 pounds        Physical examination:Skin is

## 2024-01-07 LAB
SEND OUT REPORT: NORMAL
TEST NAME: NORMAL

## 2024-01-14 LAB
CALPROTECTIN, FECAL: 104 UG/G
SEND OUT REPORT: NORMAL
TEST NAME: NORMAL

## 2024-01-31 ENCOUNTER — OUTSIDE SERVICES (OUTPATIENT)
Dept: FAMILY MEDICINE CLINIC | Age: 89
End: 2024-01-31

## 2024-01-31 DIAGNOSIS — F32.A DEPRESSION, UNSPECIFIED DEPRESSION TYPE: ICD-10-CM

## 2024-01-31 DIAGNOSIS — M62.838 MUSCLE SPASMS OF BOTH LOWER EXTREMITIES: ICD-10-CM

## 2024-01-31 DIAGNOSIS — I63.9 CEREBROVASCULAR ACCIDENT (CVA), UNSPECIFIED MECHANISM (HCC): ICD-10-CM

## 2024-01-31 DIAGNOSIS — R19.7 DIARRHEA, UNSPECIFIED TYPE: ICD-10-CM

## 2024-01-31 DIAGNOSIS — G47.00 INSOMNIA, UNSPECIFIED TYPE: Primary | ICD-10-CM

## 2024-01-31 NOTE — PROGRESS NOTES
1/31/2024    Shannan Membreno  1935    This resident is being seen today for a follow-up visit.  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  She is a 89 y.o. female resident who is being seen today for a follow-up evaluation with resident indicating that she saw gastroenterology on Friday and she indicates that she had abdominal x-rays at the hospital setting and that they had also placed her on metronidazole every 8 hours for 7 days and she states that during that timeframe she actually felt better.  She also indicates that she had to bowel test completed.  She maintains the benefit of MiraLAX every Monday, Wednesday and Friday and has lactulose as needed.  She also indicates that she just does not sleep well.  She states that this has been essentially an ongoing issue for a long period of time but that she has never really talked about it because she just takes the melatonin although she does realize that it is ineffective.  She also indicates that she still has some spasms to her lower legs from time to time but that she has not been taking her Flexeril.  She has no headaches or dizziness, sore throat, chest pain, nausea or vomiting, dysuria or frequency, fever or chills, falls or syncopal events.      Medications:  Lisinopril 20 mg 1 twice daily  Amlodipine besylate 5 mg daily   Gabapentin 300 mg 2 daily   hydrochlorothiazide 25 mg daily  Aspirin 81 mg daily  Refresh eyedrops  Vitamin D3 2000 units daily  Calcium 600 mg daily  Acidophilus 1 tablet daily  MiraLAX 17 g M-W-  Lactulose daily as needed  Trazodone 25 mg at bedtime        Objective     Vital Signs: /60 heart rate 63 respirations 16 temperature

## 2024-02-14 ENCOUNTER — OUTSIDE SERVICES (OUTPATIENT)
Dept: FAMILY MEDICINE CLINIC | Age: 89
End: 2024-02-14

## 2024-02-14 DIAGNOSIS — I10 ESSENTIAL HYPERTENSION: ICD-10-CM

## 2024-02-14 DIAGNOSIS — M81.0 OSTEOPOROSIS WITHOUT CURRENT PATHOLOGICAL FRACTURE, UNSPECIFIED OSTEOPOROSIS TYPE: ICD-10-CM

## 2024-02-14 DIAGNOSIS — G47.00 INSOMNIA, UNSPECIFIED TYPE: ICD-10-CM

## 2024-02-14 DIAGNOSIS — G62.9 NEUROPATHY: ICD-10-CM

## 2024-02-14 DIAGNOSIS — R26.9 GAIT DISTURBANCE: Primary | ICD-10-CM

## 2024-02-14 NOTE — PROGRESS NOTES
2/14/2024    Shannan Haseeb  1935    This resident is being seen today for a follow-up visit.  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  She is a 89 y.o. female resident who is being seen today for a 2-week evaluation regarding insomnia with which she was placed on trazodone.  She states that she has been \"wobbly\" and very tired.  She states that she has actually been using her cane to help with ambulation.  She is a resident who had previously been on 10 mg of melatonin which was ineffective so I had placed her on a low-dose trazodone.  The concern at this time is that is causing adverse effects.  She does indicate that regarding her ambulation that she has been doing exercise classes with balance classes.  She has no headaches or dizziness, sore throat, chest pain, coughing or shortness of breath, nausea or vomiting, constipation or diarrhea, fever or chills, falls or syncopal events.        Medications:  Lisinopril 20 mg 1 twice daily  Amlodipine besylate 5 mg daily   Gabapentin 300 mg 2 daily   hydrochlorothiazide 25 mg daily  Aspirin 81 mg daily  Refresh eyedrops  Vitamin D3 2000 units daily  Calcium 600 mg daily  Acidophilus 1 tablet daily  MiraLAX 17 g M-W-  Lactulose daily as needed  Tylenol PM        Objective     Vital Signs: /62 heart rate 76 respirations 14 temperature 98.6 SPO2 100% weight 126.6 pounds        Physical examination:Skin is essentially warm and dry.  She does have an area to the right lower extremity with which she had a previous staph infection with some scarring.  Surgical scar noted to anterior aspect of right knee.  HEENT right ear with wax noted but otherwise unremarkable.

## 2024-02-28 ENCOUNTER — OUTSIDE SERVICES (OUTPATIENT)
Dept: FAMILY MEDICINE CLINIC | Age: 89
End: 2024-02-28

## 2024-02-28 DIAGNOSIS — G47.00 INSOMNIA, UNSPECIFIED TYPE: Primary | ICD-10-CM

## 2024-02-28 DIAGNOSIS — R19.7 DIARRHEA, UNSPECIFIED TYPE: ICD-10-CM

## 2024-02-28 DIAGNOSIS — M25.511 CHRONIC RIGHT SHOULDER PAIN: ICD-10-CM

## 2024-02-28 DIAGNOSIS — G89.29 CHRONIC RIGHT SHOULDER PAIN: ICD-10-CM

## 2024-02-28 DIAGNOSIS — M81.0 OSTEOPOROSIS WITHOUT CURRENT PATHOLOGICAL FRACTURE, UNSPECIFIED OSTEOPOROSIS TYPE: ICD-10-CM

## 2024-02-28 DIAGNOSIS — I63.9 CEREBROVASCULAR ACCIDENT (CVA), UNSPECIFIED MECHANISM (HCC): ICD-10-CM

## 2024-02-29 NOTE — PROGRESS NOTES
2/28/2024    Shannan Membreno  1935    This resident is being seen today for a follow-up visit.  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  She is a 89 y.o. female resident who is being seen today for a follow-up evaluation with respect to the fact that I did recently place her on Tylenol PM due to insomnia and she feels that it has been effective.  She indicates that she had previously had nights of absolutely no sleep and she has not had any of those nights since the addition of the Tylenol PM.  She furthermore states that she has been sleeping longer hours as well.  She does indicate that she has some right hip pain off and on but she does not feel that she needs any further intervention in this regard.  She does feel that the pain was probably exacerbated with her history 2 falls with which occurred the week before Thanksgiving and then another fall around [place].  She states that the fall around [place] set her back in terms of her right shoulder with which she had a history of \"frozen shoulder\" and she did have therapy which was effective until her fall.  She furthermore states that she has ongoing gastrointestinal issues but she is scheduled to be seen in conjunction with GI next month.  She has no headaches or dizziness, sore throat, nausea or vomiting, dysuria or frequency, fever or chills, syncopal events or seizure activity.      Medications:  Lisinopril 20 mg 1 twice daily  Amlodipine besylate 5 mg daily   Gabapentin 300 mg 2 daily   hydrochlorothiazide 25 mg daily  Aspirin 81 mg daily  Refresh eyedrops  Vitamin D3 2000 units daily  Calcium 600 mg daily  MiraLAX 17 g M-W-  Lactulose daily as

## 2024-03-12 RX ORDER — AMLODIPINE BESYLATE 5 MG/1
TABLET ORAL
Qty: 90 TABLET | Refills: 1 | Status: SHIPPED | OUTPATIENT
Start: 2024-03-12

## 2024-04-24 ENCOUNTER — OUTSIDE SERVICES (OUTPATIENT)
Dept: FAMILY MEDICINE CLINIC | Age: 89
End: 2024-04-24

## 2024-04-24 DIAGNOSIS — J30.9 ALLERGIC RHINITIS, UNSPECIFIED SEASONALITY, UNSPECIFIED TRIGGER: Primary | ICD-10-CM

## 2024-04-24 DIAGNOSIS — L57.0 KERATOTIC LESION: ICD-10-CM

## 2024-04-24 DIAGNOSIS — F32.A DEPRESSION, UNSPECIFIED DEPRESSION TYPE: ICD-10-CM

## 2024-04-24 DIAGNOSIS — E78.2 MIXED HYPERLIPIDEMIA: ICD-10-CM

## 2024-04-24 DIAGNOSIS — I10 ESSENTIAL HYPERTENSION: ICD-10-CM

## 2024-04-24 NOTE — PROGRESS NOTES
4/24/2024    Shannan Membreno  1935    This resident is being seen today for a follow-up visit.  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  She is a 89 y.o. female resident who is being seen today for a follow-up visit as a follow-up to her sigmoidoscopy.  This was completed on April 18 and the results did show diverticulosis to the descending colon which was biopsied as well as diverticulitis to the sigmoid colon with evidence of small internal hemorrhoids.  She states that she was unaware of any hemorrhoids and has had no evidence of bleeding with bowel movements.  She states that she does have the ongoing issues with bright yellow liquid bowel movements that alternate with a \"brownish applesauce color bowel movement.\"  She states that she did update the gastroenterologist regarding this and that she is eating more fiber as per the request and has a follow-up appointment pending with them for next Friday.  She does admit that she has some ongoing stomach issues with respect to her GI upset.  She did furthermore states that she has not had her DEXA scan as of yet and has been holding off on her Prolia injection as she is waiting to get things taken care of with the GI portion.  She did bring to my attention that she has had an area of concern to her upper left thigh and she states that she has been applying Neosporin at least for the course of the last 2 months but would like this to be evaluated.  She has no complaints with respect to the area on her thigh and furthermore denies any headaches or dizziness, sore throat, chest pain, nausea or vomiting, dysuria or frequency, fever or chills, falls or syncopal

## 2024-05-23 LAB
25(OH)D3 SERPL-MCNC: 41.8 NG/ML (ref 30–100)
BASOPHILS # BLD: 0.07 K/UL (ref 0–0.2)
BASOPHILS NFR BLD: 2 % (ref 0–2)
EOSINOPHIL # BLD: 0.22 K/UL (ref 0.05–0.5)
EOSINOPHILS RELATIVE PERCENT: 5 % (ref 0–6)
ERYTHROCYTE [DISTWIDTH] IN BLOOD BY AUTOMATED COUNT: 14.1 % (ref 11.5–15)
HCT VFR BLD AUTO: 30 % (ref 34–48)
HGB BLD-MCNC: 9.8 G/DL (ref 11.5–15.5)
IMM GRANULOCYTES # BLD AUTO: <0.03 K/UL (ref 0–0.58)
IMM GRANULOCYTES NFR BLD: 0 % (ref 0–5)
LYMPHOCYTES NFR BLD: 1.55 K/UL (ref 1.5–4)
LYMPHOCYTES RELATIVE PERCENT: 32 % (ref 20–42)
MCH RBC QN AUTO: 31.8 PG (ref 26–35)
MCHC RBC AUTO-ENTMCNC: 32.7 G/DL (ref 32–34.5)
MCV RBC AUTO: 97.4 FL (ref 80–99.9)
MONOCYTES NFR BLD: 0.73 K/UL (ref 0.1–0.95)
MONOCYTES NFR BLD: 15 % (ref 2–12)
NEUTROPHILS NFR BLD: 46 % (ref 43–80)
NEUTS SEG NFR BLD: 2.22 K/UL (ref 1.8–7.3)
PLATELET # BLD AUTO: 187 K/UL (ref 130–450)
PMV BLD AUTO: 11.2 FL (ref 7–12)
RBC # BLD AUTO: 3.08 M/UL (ref 3.5–5.5)
VIT B12 SERPL-MCNC: 570 PG/ML (ref 211–946)
WBC OTHER # BLD: 4.8 K/UL (ref 4.5–11.5)

## 2024-05-24 LAB
ALBUMIN SERPL-MCNC: 3.4 G/DL (ref 3.5–5.2)
ALP SERPL-CCNC: 32 U/L (ref 35–104)
ALT SERPL-CCNC: 10 U/L (ref 0–32)
ANION GAP SERPL CALCULATED.3IONS-SCNC: 12 MMOL/L (ref 7–16)
AST SERPL-CCNC: 19 U/L (ref 0–31)
BILIRUB SERPL-MCNC: 0.4 MG/DL (ref 0–1.2)
BUN SERPL-MCNC: 26 MG/DL (ref 6–23)
CALCIUM SERPL-MCNC: 9.2 MG/DL (ref 8.6–10.2)
CHLORIDE SERPL-SCNC: 97 MMOL/L (ref 98–107)
CO2 SERPL-SCNC: 23 MMOL/L (ref 22–29)
CREAT SERPL-MCNC: 1 MG/DL (ref 0.5–1)
GFR, ESTIMATED: 52 ML/MIN/1.73M2
GLUCOSE SERPL-MCNC: 76 MG/DL (ref 74–99)
POTASSIUM SERPL-SCNC: 4.5 MMOL/L (ref 3.5–5)
PROT SERPL-MCNC: 5.4 G/DL (ref 6.4–8.3)
SODIUM SERPL-SCNC: 132 MMOL/L (ref 132–146)

## 2024-05-29 ENCOUNTER — OUTSIDE SERVICES (OUTPATIENT)
Dept: FAMILY MEDICINE CLINIC | Age: 89
End: 2024-05-29

## 2024-05-29 DIAGNOSIS — G62.9 NEUROPATHY: ICD-10-CM

## 2024-05-29 DIAGNOSIS — I10 ESSENTIAL HYPERTENSION: ICD-10-CM

## 2024-05-29 DIAGNOSIS — M81.0 AGE-RELATED OSTEOPOROSIS WITHOUT CURRENT PATHOLOGICAL FRACTURE: ICD-10-CM

## 2024-05-29 DIAGNOSIS — I63.9 CEREBROVASCULAR ACCIDENT (CVA), UNSPECIFIED MECHANISM (HCC): ICD-10-CM

## 2024-05-29 DIAGNOSIS — K59.1 FUNCTIONAL DIARRHEA: Primary | ICD-10-CM

## 2024-05-29 NOTE — PROGRESS NOTES
rate 59 respirations 12 temperature 97.3 SPO2 99% weight 121 pounds        Physical examination:Skin is essentially warm and dry.  She does have what appears to be a keratotic lesion with some irritation to the upper left thigh.  She does have an area to the right lower extremity with which she had a previous staph infection with some scarring.  Surgical scar noted to anterior aspect of right knee.  HEENT unremarkable. Neck is supple. Heart regular rate and rhythm. No rubs, gallops or murmurs noted.  Lungs are clear to auscultation.  No evidence of rhonchi, rales, or wheezing. Abdomen is soft, supple and non-tender.  Bowel sounds are noted x4 quadrants. No rigidity, guarding or rebound tenderness.  Negative Perkins's, negative McBurney's, negative Harinder's.  Extremities; she does have some degree of residual edema to the bilateral lower extremities.  Pulses are adequate. No clubbing  or no cyanosis noted.  She does have some arthritic deformities most notably to the right knee.  Examination of the back does have evidence of dorsal scoliosis to the right.  Neurologically she  is alert and oriented x3.  No evidence of paralysis or paresthesias noted.    Diagnoses and all orders for this visit:    Functional diarrhea  Comments:  Most likely secondary to underlying IBS with recommendations for budesonide 9 mg daily for 8 weeks and a reevaluation in 1 month    Cerebrovascular accident (CVA), unspecified mechanism (HCC)  Comments:  Maintain aspirin with lipid and blood pressure management    Neuropathy  Comments:  Controlled with gabapentin    Age-related osteoporosis without current pathological fracture  Comments:  Stable with vitamin supplementation    Essential hypertension  Comments:  Controlled with lisinopril, amlodipine and low-dose diuretic management                                    Plan:  Plan of care was discussed with the healthcare team with medications and labs reviewed.  H/H 9.8/30 previously 11.1/33.6

## 2024-06-26 LAB
BASOPHILS # BLD: 0.03 K/UL (ref 0–0.2)
BASOPHILS NFR BLD: 1 % (ref 0–2)
EOSINOPHIL # BLD: 0.08 K/UL (ref 0.05–0.5)
EOSINOPHILS RELATIVE PERCENT: 1 % (ref 0–6)
ERYTHROCYTE [DISTWIDTH] IN BLOOD BY AUTOMATED COUNT: 14 % (ref 11.5–15)
HCT VFR BLD AUTO: 33.8 % (ref 34–48)
HGB BLD-MCNC: 11.2 G/DL (ref 11.5–15.5)
IMM GRANULOCYTES # BLD AUTO: <0.03 K/UL (ref 0–0.58)
IMM GRANULOCYTES NFR BLD: 0 % (ref 0–5)
IRON SATN MFR SERPL: 21 % (ref 15–50)
IRON SERPL-MCNC: 64 UG/DL (ref 37–145)
LYMPHOCYTES NFR BLD: 1.83 K/UL (ref 1.5–4)
LYMPHOCYTES RELATIVE PERCENT: 31 % (ref 20–42)
MCH RBC QN AUTO: 32.3 PG (ref 26–35)
MCHC RBC AUTO-ENTMCNC: 33.1 G/DL (ref 32–34.5)
MCV RBC AUTO: 97.4 FL (ref 80–99.9)
MONOCYTES NFR BLD: 0.76 K/UL (ref 0.1–0.95)
MONOCYTES NFR BLD: 13 % (ref 2–12)
NEUTROPHILS NFR BLD: 55 % (ref 43–80)
NEUTS SEG NFR BLD: 3.26 K/UL (ref 1.8–7.3)
PLATELET # BLD AUTO: 209 K/UL (ref 130–450)
PMV BLD AUTO: 10.6 FL (ref 7–12)
RBC # BLD AUTO: 3.47 M/UL (ref 3.5–5.5)
TIBC SERPL-MCNC: 298 UG/DL (ref 250–450)
WBC OTHER # BLD: 6 K/UL (ref 4.5–11.5)

## 2024-06-27 ENCOUNTER — OUTSIDE SERVICES (OUTPATIENT)
Dept: FAMILY MEDICINE CLINIC | Age: 89
End: 2024-06-27
Payer: MEDICARE

## 2024-06-27 DIAGNOSIS — F32.A DEPRESSION, UNSPECIFIED DEPRESSION TYPE: ICD-10-CM

## 2024-06-27 DIAGNOSIS — K59.1 FUNCTIONAL DIARRHEA: Primary | ICD-10-CM

## 2024-06-27 DIAGNOSIS — M81.0 OSTEOPOROSIS WITHOUT CURRENT PATHOLOGICAL FRACTURE, UNSPECIFIED OSTEOPOROSIS TYPE: ICD-10-CM

## 2024-06-27 DIAGNOSIS — E78.2 MIXED HYPERLIPIDEMIA: ICD-10-CM

## 2024-06-27 DIAGNOSIS — G47.00 INSOMNIA, UNSPECIFIED TYPE: ICD-10-CM

## 2024-06-27 PROCEDURE — 99349 HOME/RES VST EST MOD MDM 40: CPT | Performed by: NURSE PRACTITIONER

## 2024-06-27 NOTE — PROGRESS NOTES
6/26/2024    Shannan Membreno  1935    This resident is being seen today for a follow-up visit.  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  She is a 89 y.o. female resident who is being seen today for a follow-up evaluation regarding ongoing abdominal discomfort with what she describes as a change in her bowels which appears to be like a \"brown applesauce and bright yellow.\"  She states that her bowels are actually \"sore.\"  She does keep a food log of everything she eats and she has been seen in conjunction with the department of gastroenterology in the recent months past and is rather upset that they do not want to see her back until 6 months.  It is of note to mention that upon my last evaluation of this resident I had placed her on budesonide and she thought that she was having some improvement but unfortunately her bowels have went back to her normal status for her.  She also admits that she has some numbness to her left pinky and left ring finger and that her hands are always cold and that the other day her right middle finger \"looked like wax.\"  She states that she has not had this happen again.  She has otherwise been doing relatively well and has not complained of any pain, headaches or dizziness, sore throat, chest pain, nausea or vomiting, dysuria or frequency, fever or chills, falls or syncopal events.      Medications:  HCTZ 25 mg daily  Lisinopril 20 mg twice daily  Gabapentin 300 mg twice daily  Vitamin B12 500 mcg daily  Calcium 600+ D3 2 tablets daily  Vitamin D3 2000 units daily  Aspirin 81 mg daily  Amlodipine 5 mg daily  Tylenol PM 1 tablet at bedtime  MiraLAX 17 g daily as

## 2024-06-28 LAB
ALBUMIN SERPL-MCNC: 3.7 G/DL (ref 3.5–5.2)
ALP SERPL-CCNC: 36 U/L (ref 35–104)
ALT SERPL-CCNC: 10 U/L (ref 0–32)
ANION GAP SERPL CALCULATED.3IONS-SCNC: 10 MMOL/L (ref 7–16)
AST SERPL-CCNC: 20 U/L (ref 0–31)
BILIRUB SERPL-MCNC: 0.5 MG/DL (ref 0–1.2)
BUN SERPL-MCNC: 31 MG/DL (ref 6–23)
CALCIUM SERPL-MCNC: 9.7 MG/DL (ref 8.6–10.2)
CHLORIDE SERPL-SCNC: 95 MMOL/L (ref 98–107)
CO2 SERPL-SCNC: 26 MMOL/L (ref 22–29)
CREAT SERPL-MCNC: 1.4 MG/DL (ref 0.5–1)
GFR, ESTIMATED: 38 ML/MIN/1.73M2
GLUCOSE SERPL-MCNC: 77 MG/DL (ref 74–99)
POTASSIUM SERPL-SCNC: 4.8 MMOL/L (ref 3.5–5)
PROT SERPL-MCNC: 6 G/DL (ref 6.4–8.3)
SODIUM SERPL-SCNC: 131 MMOL/L (ref 132–146)

## 2024-07-26 ENCOUNTER — OUTSIDE SERVICES (OUTPATIENT)
Dept: FAMILY MEDICINE CLINIC | Age: 89
End: 2024-07-26
Payer: MEDICARE

## 2024-07-26 DIAGNOSIS — K59.1 FUNCTIONAL DIARRHEA: Primary | ICD-10-CM

## 2024-07-26 DIAGNOSIS — I10 ESSENTIAL HYPERTENSION: ICD-10-CM

## 2024-07-26 DIAGNOSIS — I63.9 CEREBROVASCULAR ACCIDENT (CVA), UNSPECIFIED MECHANISM (HCC): ICD-10-CM

## 2024-07-26 DIAGNOSIS — M81.0 AGE-RELATED OSTEOPOROSIS WITHOUT CURRENT PATHOLOGICAL FRACTURE: ICD-10-CM

## 2024-07-26 DIAGNOSIS — G62.9 NEUROPATHY: ICD-10-CM

## 2024-07-26 PROCEDURE — 99349 HOME/RES VST EST MOD MDM 40: CPT | Performed by: NURSE PRACTITIONER

## 2024-07-26 NOTE — PROGRESS NOTES
without contrast due to the ongoing abdominal pain with diarrhea which will be completed at [facility].  I would like to follow-up with her after the CAT scan is completed.  However, she should need to be seen sooner she can call the clinic for an appointment.  She will otherwise continue with the current management as directed and I will continue to see this resident routinely and as needed with further orders forthcoming.      GIOVANNA NEWSOME, APRN - CNP      *Note was creating using voice recognition software.  The document was reviewed however grammatical errors may exist.

## 2024-08-12 LAB
ALBUMIN SERPL-MCNC: 3.6 G/DL (ref 3.5–5.2)
ALP SERPL-CCNC: 33 U/L (ref 35–104)
ALT SERPL-CCNC: 9 U/L (ref 0–32)
ANION GAP SERPL CALCULATED.3IONS-SCNC: 13 MMOL/L (ref 7–16)
AST SERPL-CCNC: 21 U/L (ref 0–31)
BILIRUB SERPL-MCNC: 0.3 MG/DL (ref 0–1.2)
BUN SERPL-MCNC: 25 MG/DL (ref 6–23)
CALCIUM SERPL-MCNC: 9.7 MG/DL (ref 8.6–10.2)
CHLORIDE SERPL-SCNC: 100 MMOL/L (ref 98–107)
CO2 SERPL-SCNC: 24 MMOL/L (ref 22–29)
CREAT SERPL-MCNC: 1.1 MG/DL (ref 0.5–1)
GFR, ESTIMATED: 50 ML/MIN/1.73M2
GLUCOSE SERPL-MCNC: 76 MG/DL (ref 74–99)
POTASSIUM SERPL-SCNC: 4.7 MMOL/L (ref 3.5–5)
PROT SERPL-MCNC: 5.5 G/DL (ref 6.4–8.3)
SODIUM SERPL-SCNC: 137 MMOL/L (ref 132–146)

## 2024-09-04 RX ORDER — AMLODIPINE BESYLATE 5 MG/1
TABLET ORAL
Qty: 90 TABLET | Refills: 1 | Status: SHIPPED | OUTPATIENT
Start: 2024-09-04

## 2024-09-05 ENCOUNTER — OUTSIDE SERVICES (OUTPATIENT)
Dept: FAMILY MEDICINE CLINIC | Age: 89
End: 2024-09-05

## 2024-09-05 DIAGNOSIS — K59.1 FUNCTIONAL DIARRHEA: ICD-10-CM

## 2024-09-05 DIAGNOSIS — I10 ESSENTIAL HYPERTENSION: ICD-10-CM

## 2024-09-05 DIAGNOSIS — F32.A DEPRESSION, UNSPECIFIED DEPRESSION TYPE: ICD-10-CM

## 2024-09-05 DIAGNOSIS — E78.2 MIXED HYPERLIPIDEMIA: ICD-10-CM

## 2024-09-05 DIAGNOSIS — R53.83 LACK OF ENERGY: Primary | ICD-10-CM

## 2024-09-05 DIAGNOSIS — G47.00 INSOMNIA, UNSPECIFIED TYPE: ICD-10-CM

## 2024-09-05 LAB — TSH SERPL DL<=0.05 MIU/L-ACNC: 2.74 UIU/ML (ref 0.27–4.2)

## 2024-09-05 NOTE — PROGRESS NOTES
continue to follow along with gastroenterology as recommended.  However, given the concern regarding her lack of energy and her history of fainting and the questionable irregularity of her heart, I am going to do some further workup.  I will obtain a TSH in the morning and a EKG today along with a 2D echo.  I will ask that she monitor her blood pressure daily at the clinic Thursday and Friday of this week and for a few days next week with which I will evaluate.  I did agree to follow-up with her in 3 months pending the results of her further testing.  Furthermore she will continue with the plan of care as otherwise indicated and I will continue to see her routinely and as needed with further orders forthcoming.    GIOVANNA NEWSOME, APRN - CNP      *Note was creating using voice recognition software.  The document was reviewed however grammatical errors may exist.

## 2025-02-19 ENCOUNTER — OUTSIDE SERVICES (OUTPATIENT)
Dept: PRIMARY CARE CLINIC | Age: 89
End: 2025-02-19

## 2025-02-19 DIAGNOSIS — R22.0 TONGUE SWELLING: ICD-10-CM

## 2025-02-19 DIAGNOSIS — I10 PRIMARY HYPERTENSION: ICD-10-CM

## 2025-02-19 DIAGNOSIS — M81.0 AGE-RELATED OSTEOPOROSIS WITHOUT CURRENT PATHOLOGICAL FRACTURE: ICD-10-CM

## 2025-02-19 DIAGNOSIS — K58.0 IRRITABLE BOWEL SYNDROME WITH DIARRHEA: ICD-10-CM

## 2025-02-19 DIAGNOSIS — B37.0 THRUSH: Primary | ICD-10-CM

## 2025-02-20 NOTE — PROGRESS NOTES
area to the right lower extremity with which she had a previous staph infection with some scarring.  Surgical scar noted to anterior aspect of right knee.  HEENT notable thrush to her tongue but otherwise unremarkable. Neck is supple. Heart with questionable irregularity. No rubs, gallops or murmurs noted.  Lungs are clear to auscultation.  No evidence of rhonchi, rales, or wheezing. Abdomen is soft, supple and non-tender.  Bowel sounds are noted x4 quadrants. No rigidity, guarding or rebound tenderness.  Negative Perkins's, negative McBurney's, negative Harinder's.  Extremities; she does have some degree of residual edema to the bilateral lower extremities.  Pulses are adequate. No clubbing  or no cyanosis noted.  She does have some arthritic deformities most notably to the right knee.  Examination of the back does have evidence of dorsal scoliosis to the right.  Neurologically she  is alert and oriented x3.  No evidence of paralysis or paresthesias noted.    Diagnoses and all orders for this visit:    Thrush  Comments:  Initiate Mycelex Kusum with 1 lozenge or 5 times a day for 7 days.  Initiate warm salt water gargles twice daily.    Tongue swelling  Comments:  Initiate prednisone 20 mg daily for 3 days    Irritable bowel syndrome with diarrhea  Comments:  Diarrhea improved with the benefit of Metamucil    Primary hypertension  Comments:  Controlled with HCTZ, lisinopril and amlodipine    Age-related osteoporosis without current pathological fracture  Comments:  Stable with vitamin supplementation                                            Plan:  Plan of care was discussed with the healthcare team with medications and labs reviewed.  She did appear to have some degree of some thickness to her tongue and the left side of the tongue was very slightly swollen.  I did feel that she may have some changes consistent with underlying thrush so I am going to recommend Mycelex Kusum 1 lozenge or 5 times a day for the next 7

## 2025-02-26 ENCOUNTER — OUTSIDE SERVICES (OUTPATIENT)
Dept: PRIMARY CARE CLINIC | Age: 89
End: 2025-02-26

## 2025-02-26 DIAGNOSIS — B37.0 THRUSH: ICD-10-CM

## 2025-02-26 DIAGNOSIS — I10 PRIMARY HYPERTENSION: Primary | ICD-10-CM

## 2025-02-26 DIAGNOSIS — M54.50 CHRONIC LOW BACK PAIN WITHOUT SCIATICA, UNSPECIFIED BACK PAIN LATERALITY: ICD-10-CM

## 2025-02-26 DIAGNOSIS — E53.8 B12 DEFICIENCY: ICD-10-CM

## 2025-02-26 DIAGNOSIS — G89.29 CHRONIC LOW BACK PAIN WITHOUT SCIATICA, UNSPECIFIED BACK PAIN LATERALITY: ICD-10-CM

## 2025-02-26 DIAGNOSIS — R41.3 MEMORY DEFICIT: ICD-10-CM

## 2025-02-27 RX ORDER — AMLODIPINE BESYLATE 5 MG/1
5 TABLET ORAL DAILY
Qty: 90 TABLET | Refills: 1 | Status: CANCELLED | OUTPATIENT
Start: 2025-02-27

## 2025-02-27 NOTE — TELEPHONE ENCOUNTER
Name of Medication(s) Requested:  Requested Prescriptions     Pending Prescriptions Disp Refills    amLODIPine (NORVASC) 5 MG tablet 90 tablet 1     Sig: Take 1 tablet by mouth daily       Medication is on current medication list Yes    Dosage and directions were verified? Yes    Quantity verified: 90 day supply     Pharmacy Verified?  Yes    Last Appointment:  Visit date not found    Future appts:  No future appointments.     (If no appt send self scheduling link. .REFILLAPPT)  Scheduling request sent?     [] Yes  [x] No    Does patient need updated?  [] Yes  [x] No

## 2025-02-27 NOTE — PROGRESS NOTES
2/26/2025      Shannan Membreno  1935    This resident is being seen today for a follow-up visit.  She is a resident who has long-term medical conditions including chronic abdominal pain most likely consistent with IBS, functional constipation, chronic lumbar pain, hypertension, hyperlipidemia, cerebrovascular disease, CVA, osteoporosis, degenerative arthritis with a surgical history including status post right knee replacement in years past.  She is a 89 y.o. female resident who is being seen today for a follow-up evaluation with which I had recently given this resident the benefit of prednisone over a short course due to what she described as some tongue swelling.  I also gave her Mycelex lozenges to help with what appeared to be some degree of an infection to her tongue.  She states today that she is now hoarse and her throat is very sore.  She states that she has had no energy and she has noticed some trouble with walking and writing.  She also indicated she was having some epigastric pain over the weekend which has essentially resolved but she also stated that she was having some yellow liquid bowels and she had taken herself off the Metamucil for short time which could have contributed to this issue.  Upon my evaluation of her last week she did have an elevated blood pressure but she had left the clinic before given any orders.  Unfortunately, her blood pressure does remain high today.  She states that she has no headaches or dizziness, chest pain, nausea or vomiting, constipation or diarrhea, dysuria or frequency, fever or chills, falls or syncopal events.  She did bring to my attention several times that she feels that her memory is failing her and she did have evidence of repetitiveness during today's evaluation.      Medications:  HCTZ 25 mg twice daily  Lisinopril 20 mg twice daily  Gabapentin 300 mg twice daily  Vitamin B12 500 mcg daily  Calcium 600+ D3 2 tablets daily  Vitamin D3 2000 units

## 2025-03-07 NOTE — TELEPHONE ENCOUNTER
Patient is at [facility].    Name of Medication(s) Requested:  Requested Prescriptions     Pending Prescriptions Disp Refills    amLODIPine (NORVASC) 5 MG tablet 90 tablet 3     Sig: Take 1 tablet by mouth daily       Medication is on current medication list Yes    Dosage and directions were verified? Yes    Quantity verified: 90 day supply     Pharmacy Verified?  Yes    Last Appointment:  Visit date not found    Future appts:  No future appointments.     (If no appt send self scheduling link. .REFILLAPPT)  Scheduling request sent?     [] Yes  [x] No    Does patient need updated?  [] Yes  [x] No

## 2025-03-10 RX ORDER — AMLODIPINE BESYLATE 5 MG/1
5 TABLET ORAL DAILY
Qty: 90 TABLET | Refills: 3 | Status: SHIPPED | OUTPATIENT
Start: 2025-03-10

## 2025-03-12 ENCOUNTER — OUTSIDE SERVICES (OUTPATIENT)
Dept: PRIMARY CARE CLINIC | Age: 89
End: 2025-03-12
Payer: MEDICARE

## 2025-03-12 DIAGNOSIS — B37.0 THRUSH: ICD-10-CM

## 2025-03-12 DIAGNOSIS — I10 PRIMARY HYPERTENSION: Primary | ICD-10-CM

## 2025-03-12 DIAGNOSIS — M81.0 AGE-RELATED OSTEOPOROSIS WITHOUT CURRENT PATHOLOGICAL FRACTURE: ICD-10-CM

## 2025-03-12 DIAGNOSIS — K58.0 IRRITABLE BOWEL SYNDROME WITH DIARRHEA: ICD-10-CM

## 2025-03-12 DIAGNOSIS — G89.29 CHRONIC LOW BACK PAIN WITHOUT SCIATICA, UNSPECIFIED BACK PAIN LATERALITY: ICD-10-CM

## 2025-03-12 DIAGNOSIS — M54.50 CHRONIC LOW BACK PAIN WITHOUT SCIATICA, UNSPECIFIED BACK PAIN LATERALITY: ICD-10-CM

## 2025-03-12 PROCEDURE — 99349 HOME/RES VST EST MOD MDM 40: CPT | Performed by: NURSE PRACTITIONER

## 2025-03-12 NOTE — PROGRESS NOTES
3/12/2025      Shannan Haseeb  1935    This resident is being seen today for a follow-up visit.  She is a resident who has long-term medical conditions including chronic abdominal pain most likely consistent with IBS, functional constipation, chronic lumbar pain, hypertension, hyperlipidemia, cerebrovascular disease, CVA, osteoporosis, degenerative arthritis with a surgical history including status post right knee replacement in years past.  She is a 89 y.o. female resident who is being seen today for a follow-up visit with which this patient has been under assessment for hypertensive etiology.  She does have some improvement of her blood pressure with the previous upward titration of her hydrochlorothiazide but unfortunately blood pressure does remain high.  She does however state that her throat has improved after treatment with the Mycelex, prednisone and the most recent nystatin swish and swallow.  She states that her tongue is no longer yellow.  She does feel that she gets somewhat hoarse the more she talks.  She is very well aware that she needs to drink more water.  At this time she denies headaches or dizziness, coughing or shortness of breath, nausea or vomiting, constipation or diarrhea, fever or chills, falls or syncopal events.        Medications:  HCTZ 50 mg twice daily  Lisinopril 20 mg twice daily  Gabapentin 300 mg twice daily  Vitamin B12 500 mcg daily  Calcium 600+ D3 2 tablets daily  Vitamin D3 2000 units daily  Aspirin 81 mg daily  Amlodipine 5 mg daily  Tylenol PM 1 tablet at bedtime  MiraLAX 17 g daily as needed  Metamucil 1 teaspoon daily        Objective     Vital Signs: /56 heart rate 67 respirations 14 temperature 98.2 SPO2 100% weight 122 pounds        Physical examination:Skin is essentially warm and dry.  She does have what appears to be a keratotic lesion with some irritation to the upper left thigh.  She does have an area to the right lower extremity with which she had a

## 2025-03-24 ENCOUNTER — OUTSIDE SERVICES (OUTPATIENT)
Dept: PRIMARY CARE CLINIC | Age: 89
End: 2025-03-24

## 2025-03-24 DIAGNOSIS — I10 PRIMARY HYPERTENSION: Primary | ICD-10-CM

## 2025-03-24 DIAGNOSIS — K58.0 IRRITABLE BOWEL SYNDROME WITH DIARRHEA: ICD-10-CM

## 2025-03-24 DIAGNOSIS — M81.0 AGE-RELATED OSTEOPOROSIS WITHOUT CURRENT PATHOLOGICAL FRACTURE: ICD-10-CM

## 2025-03-24 DIAGNOSIS — R63.4 WEIGHT LOSS: ICD-10-CM

## 2025-03-24 DIAGNOSIS — E53.8 B12 DEFICIENCY: ICD-10-CM

## 2025-03-25 NOTE — PROGRESS NOTES
3/24/2025      Shannan Membreno  1935    This resident is being seen today for a follow-up visit.  She is a resident who has long-term medical conditions including chronic abdominal pain most likely consistent with IBS, functional constipation, chronic lumbar pain, hypertension, hyperlipidemia, cerebrovascular disease, CVA, osteoporosis, degenerative arthritis with a surgical history including status post right knee replacement in years past.  She is a 89 y.o. female resident who is being seen today for a 2-week follow-up regarding her blood pressure management with which I had recently upward titrated her hydrochlorothiazide.  She does take this along with lisinopril twice daily.  She states that she had accidentally forgot to take her nighttime pills last night which she has not done in times past and then she was up all night tossing and turning and she fears that has led to an elevated blood pressure on today's evaluation.  She states that she has had no headaches or dizziness, sore throat, chest pain, nausea or vomiting, constipation or diarrhea, fever or chills, falls or syncopal events.  She does have concern regarding some ongoing weight loss she has had a history of CAT scan of the abdomen just in the recent months past.  I did furthermore discussed with her the benefit of a supplement such as Ensure she takes that occasionally and indicates that this is when her son supplies it.  She did states she would speak with him regarding getting her some more.    Medications:  HCTZ 50 mg twice daily  Lisinopril 20 mg twice daily  Gabapentin 300 mg twice daily  Vitamin B12 500 mcg daily  Calcium 600+ D3 2 tablets daily  Vitamin D3 2000 units daily  Aspirin 81 mg daily  Amlodipine 5 mg daily  Tylenol PM 1 tablet at bedtime  MiraLAX 17 g daily as needed  Metamucil 1 teaspoon daily        Objective     Vital Signs: /62 heart rate 64 respirations 16 temperature 98.2 SPO2 100% weight 120.4

## 2025-04-14 ENCOUNTER — OUTSIDE SERVICES (OUTPATIENT)
Dept: PRIMARY CARE CLINIC | Age: 89
End: 2025-04-14
Payer: MEDICARE

## 2025-04-14 DIAGNOSIS — I10 PRIMARY HYPERTENSION: Primary | ICD-10-CM

## 2025-04-14 DIAGNOSIS — K58.0 IRRITABLE BOWEL SYNDROME WITH DIARRHEA: ICD-10-CM

## 2025-04-14 DIAGNOSIS — M81.0 AGE-RELATED OSTEOPOROSIS WITHOUT CURRENT PATHOLOGICAL FRACTURE: ICD-10-CM

## 2025-04-14 DIAGNOSIS — E53.8 B12 DEFICIENCY: ICD-10-CM

## 2025-04-14 DIAGNOSIS — G89.29 CHRONIC LOW BACK PAIN WITHOUT SCIATICA, UNSPECIFIED BACK PAIN LATERALITY: ICD-10-CM

## 2025-04-14 DIAGNOSIS — M54.50 CHRONIC LOW BACK PAIN WITHOUT SCIATICA, UNSPECIFIED BACK PAIN LATERALITY: ICD-10-CM

## 2025-04-14 PROCEDURE — 99349 HOME/RES VST EST MOD MDM 40: CPT | Performed by: NURSE PRACTITIONER

## 2025-04-14 NOTE — PROGRESS NOTES
4/14/2025      Shannan Membreno  1935    This resident is being seen today for a follow-up visit.  She is a resident who has long-term medical conditions including chronic abdominal pain most likely consistent with IBS, functional constipation, chronic lumbar pain, hypertension, hyperlipidemia, cerebrovascular disease, CVA, osteoporosis, degenerative arthritis with a surgical history including status post right knee replacement in years past.  She is a 89 y.o. female resident who is being seen today for a follow-up visit with respect to blood pressure management with which her blood pressure does appear to be running relatively on the high side as she has been having a monitor here at the clinic this last week.  She did also state that she has been having so much stomach discomfort this last 2 days that she would like to go back to gastroenterology but states that she was not happy with the NP in [place] and would prefer to see the physician only.  It is of note to mention that I have done recent CAT scans of her abdomen in the recent months past.  She admits at this time that she has had a lot of loose bowels with color changes to her bowels.  She has no headaches or dizziness, sore throat, chest pain, nausea or vomiting, dysuria or frequency, falls or syncopal events.      Medications:  HCTZ 50 mg twice daily  Lisinopril 20 mg twice daily  Gabapentin 300 mg twice daily  Vitamin B12 500 mcg daily  Calcium 600+ D3 2 tablets daily  Vitamin D3 2000 units daily  Aspirin 81 mg daily  Amlodipine 10 mg daily  Tylenol PM 1 tablet at bedtime  MiraLAX 17 g daily as needed  Metamucil 1 teaspoon daily        Objective     Vital Signs: /68 heart rate 62 respirations 17 temperature 98.5 SPO2 100% weight 122.6 pounds        Physical examination:Skin is essentially warm and dry.  She does have what appears to be a keratotic lesion with some irritation to the upper left thigh.  She does have an area to the right lower

## 2025-04-21 ENCOUNTER — OUTSIDE SERVICES (OUTPATIENT)
Dept: PRIMARY CARE CLINIC | Age: 89
End: 2025-04-21
Payer: MEDICARE

## 2025-04-21 DIAGNOSIS — K58.0 IRRITABLE BOWEL SYNDROME WITH DIARRHEA: ICD-10-CM

## 2025-04-21 DIAGNOSIS — I10 PRIMARY HYPERTENSION: ICD-10-CM

## 2025-04-21 DIAGNOSIS — R42 LIGHTHEADEDNESS: Primary | ICD-10-CM

## 2025-04-21 DIAGNOSIS — R63.4 WEIGHT LOSS: ICD-10-CM

## 2025-04-21 DIAGNOSIS — M81.0 AGE-RELATED OSTEOPOROSIS WITHOUT CURRENT PATHOLOGICAL FRACTURE: ICD-10-CM

## 2025-04-21 PROCEDURE — 99349 HOME/RES VST EST MOD MDM 40: CPT | Performed by: NURSE PRACTITIONER

## 2025-04-21 NOTE — PROGRESS NOTES
4/21/2025      Shannan Membreno  1935    This resident is being seen today for a follow-up visit.  She is a resident who has long-term medical conditions including chronic abdominal pain most likely consistent with IBS, functional constipation, chronic lumbar pain, hypertension, hyperlipidemia, cerebrovascular disease, CVA, osteoporosis, degenerative arthritis with a surgical history including status post right knee replacement in years past.  She is a 89 y.o. female resident who is being seen today for a follow-up evaluation with which the resident states that she has been having a relatively bad morning and she has been in the bathroom all morning with diarrhea.  As a matter fact, she states that she becomes very nauseated and shaky and she actually has been utilizing her cane today.  She is scheduled to see Dr. Lyons with the Department of gastroenterology 5/1/2025.  She did furthermore bring to my attention that she has been having \"spells\" where she stands too long and feels like she needs to sit down or she might pass out.  She furthermore admits that these been happening at least 3-4 times a week and also admits that she has never been under assessment for diabetes but she does have a son as well as a granddaughter both with diabetes.  She has no headaches or dizziness per se, sore throat, chest pain, nausea or vomiting, constipation, dysuria or frequency, fever or chills, syncopal events or seizure activity.        Medications:  HCTZ 50 mg twice daily  Lisinopril 20 mg twice daily  Gabapentin 300 mg twice daily  Vitamin B12 500 mcg daily  Calcium 600+ D3 2 tablets daily  Vitamin D3 2000 units daily  Aspirin 81 mg daily  Amlodipine 10 mg daily  Tylenol PM 1 tablet at bedtime  MiraLAX 17 g daily as needed  Metamucil 1 teaspoon daily        Objective     Vital Signs: /64 heart rate 75 respirations 20 temperature 98.4 SPO2 96% weight 123 pounds        Physical examination:Skin is essentially warm and

## 2025-05-28 ENCOUNTER — OUTSIDE SERVICES (OUTPATIENT)
Dept: PRIMARY CARE CLINIC | Age: 89
End: 2025-05-28
Payer: MEDICARE

## 2025-05-28 DIAGNOSIS — I67.9 CEREBROVASCULAR DISEASE: ICD-10-CM

## 2025-05-28 DIAGNOSIS — E78.2 MIXED HYPERLIPIDEMIA: ICD-10-CM

## 2025-05-28 DIAGNOSIS — R42 LIGHTHEADEDNESS: ICD-10-CM

## 2025-05-28 DIAGNOSIS — K59.04 FUNCTIONAL CONSTIPATION: ICD-10-CM

## 2025-05-28 DIAGNOSIS — I10 PRIMARY HYPERTENSION: Primary | ICD-10-CM

## 2025-05-28 PROCEDURE — 99349 HOME/RES VST EST MOD MDM 40: CPT | Performed by: NURSE PRACTITIONER

## 2025-05-29 NOTE — PROGRESS NOTES
5/28/2025      Shannan Haseeb  1935    This resident is being seen today for a follow-up visit.  She is a resident who has long-term medical conditions including chronic abdominal pain most likely consistent with IBS, functional constipation, chronic lumbar pain, hypertension, hyperlipidemia, cerebrovascular disease, CVA, osteoporosis, degenerative arthritis with a surgical history including status post right knee replacement in years past.  She is a 89 y.o. female resident who is being seen today for a follow-up visit with which this resident indicates that she was recently seen in conjunction with Department of Cardiology with Dr. Mackey and that he had made some changes with respect to her medications.  He actually decreased her hydrochlorothiazide from 50 mg twice daily to 25 mg daily given the fact that her blood pressure in his office setting was 102/48.  She states that she was rather surprised by this as she has never had any low blood pressures and that they have been running relatively high for a period of time.  She states that she still has the lightheadedness from time to time which was one of the reasons that she was actually sent to the cardiologist.  She did however state that she also feels like she could exercise more than she has in times past and she is scheduled for cardiac workup at [facility] in the course of the next 2 weeks.  Of note to mention, I had her monitoring her blood sugars fasting and 4 PM for the course of the week to determine if her lightheadedness was secondary to blood sugar management with her history of near syncopal event and her blood sugars were ranging between 89 and 111.  She does state that she actually started taking a snack with her when she goes shopping in the afternoon due to the concern of the low blood sugar.  She currently denies headaches, coughing or shortness of breath, nausea or vomiting, constipation or diarrhea, fever or chills, or any

## 2025-06-06 RX ORDER — GABAPENTIN 300 MG/1
300 CAPSULE ORAL 2 TIMES DAILY
Qty: 180 CAPSULE | Refills: 0 | Status: SHIPPED | OUTPATIENT
Start: 2025-06-06 | End: 2025-08-05

## 2025-06-06 NOTE — TELEPHONE ENCOUNTER
Name of Medication(s) Requested:  Requested Prescriptions     Pending Prescriptions Disp Refills    gabapentin (NEURONTIN) 300 MG capsule 180 capsule 1     Sig: Take 1 capsule by mouth in the morning and at bedtime.       Medication is on current medication list Yes    Dosage and directions were verified? Yes    Quantity verified: 90 day supply     Pharmacy Verified?  Yes    Last Appointment:  Visit date not found    Future appts:  No future appointments.     (If no appt send self scheduling link. .REFILLAPPT)  Scheduling request sent?     [] Yes  [x] No    Does patient need updated?  [] Yes  [x] No